# Patient Record
Sex: MALE | Race: WHITE | NOT HISPANIC OR LATINO | Employment: UNEMPLOYED | URBAN - NONMETROPOLITAN AREA
[De-identification: names, ages, dates, MRNs, and addresses within clinical notes are randomized per-mention and may not be internally consistent; named-entity substitution may affect disease eponyms.]

---

## 2019-04-08 ENCOUNTER — TELEPHONE (OUTPATIENT)
Dept: SURGERY | Facility: CLINIC | Age: 44
End: 2019-04-08

## 2019-04-10 ENCOUNTER — HOSPITAL ENCOUNTER (OUTPATIENT)
Dept: PREOP | Facility: HOSPITAL | Age: 44
Discharge: HOME | End: 2019-04-10
Admitting: SURGERY

## 2019-04-10 VITALS
BODY MASS INDEX: 33.86 KG/M2 | SYSTOLIC BLOOD PRESSURE: 130 MMHG | RESPIRATION RATE: 20 BRPM | HEIGHT: 72 IN | TEMPERATURE: 97.5 F | WEIGHT: 250 LBS | DIASTOLIC BLOOD PRESSURE: 75 MMHG | HEART RATE: 78 BPM | OXYGEN SATURATION: 97 %

## 2019-04-10 DIAGNOSIS — D50.0 IRON DEFICIENCY ANEMIA DUE TO CHRONIC BLOOD LOSS: ICD-10-CM

## 2019-04-10 DIAGNOSIS — D50.0 IRON DEFICIENCY ANEMIA DUE TO CHRONIC BLOOD LOSS: Primary | ICD-10-CM

## 2019-04-10 PROCEDURE — 91110 GI TRC IMG INTRAL ESOPH-ILE: CPT

## 2019-04-10 RX ORDER — VARENICLINE TARTRATE 1 MG/1
1 TABLET, FILM COATED ORAL 2 TIMES DAILY
Refills: 6 | COMMUNITY
Start: 2019-03-23 | End: 2021-10-13

## 2019-04-10 RX ORDER — LOSARTAN POTASSIUM 100 MG/1
100 TABLET ORAL DAILY
Refills: 6 | COMMUNITY
Start: 2019-03-18 | End: 2021-10-13

## 2019-04-10 RX ORDER — GABAPENTIN 300 MG/1
300 CAPSULE ORAL 3 TIMES DAILY
Refills: 3 | COMMUNITY
Start: 2019-01-25 | End: 2021-10-13

## 2019-04-10 NOTE — NURSING NOTE
Pt was able to swallow pill for procedure without difficulty.  Pill cam instructions given to pt and informed to return pill cam equipment to Dr. Jolly office in [place] in the morning.No s/s of distress noted upon discharge. Pt ambulated to car himself.

## 2019-06-06 ENCOUNTER — TELEPHONE (OUTPATIENT)
Dept: SURGERY | Facility: CLINIC | Age: 44
End: 2019-06-06

## 2019-06-06 NOTE — TELEPHONE ENCOUNTER
I have been trying to reach this patient regarding his pill cam report. We spoke with  office and they have asked us to try and reach the patient to see if he could redo his pill cam due to poor prep. They can't reach him by phone.  The phone number is disconnected. I will send this patient a letter requesting he call our office.

## 2021-10-13 ENCOUNTER — OFFICE VISIT (OUTPATIENT)
Dept: CARDIOLOGY | Facility: CLINIC | Age: 46
End: 2021-10-13

## 2021-10-13 VITALS
BODY MASS INDEX: 30.92 KG/M2 | SYSTOLIC BLOOD PRESSURE: 130 MMHG | HEIGHT: 70 IN | TEMPERATURE: 96.9 F | WEIGHT: 216 LBS | DIASTOLIC BLOOD PRESSURE: 80 MMHG | HEART RATE: 76 BPM

## 2021-10-13 DIAGNOSIS — R07.2 PRECORDIAL PAIN: Primary | ICD-10-CM

## 2021-10-13 DIAGNOSIS — E88.81 METABOLIC SYNDROME: ICD-10-CM

## 2021-10-13 DIAGNOSIS — I71.03 DISSECTION OF THORACOABDOMINAL AORTA (HCC): ICD-10-CM

## 2021-10-13 DIAGNOSIS — E55.9 VITAMIN D DEFICIENCY: ICD-10-CM

## 2021-10-13 DIAGNOSIS — F11.21 OPIOID USE DISORDER, SEVERE, IN SUSTAINED REMISSION (HCC): ICD-10-CM

## 2021-10-13 DIAGNOSIS — F17.200 SMOKING: ICD-10-CM

## 2021-10-13 DIAGNOSIS — I10 PRIMARY HYPERTENSION: ICD-10-CM

## 2021-10-13 DIAGNOSIS — R53.82 CHRONIC FATIGUE: ICD-10-CM

## 2021-10-13 DIAGNOSIS — I48.0 PAF (PAROXYSMAL ATRIAL FIBRILLATION) (HCC): ICD-10-CM

## 2021-10-13 PROBLEM — IMO0001 SMOKING: Status: ACTIVE | Noted: 2021-10-13

## 2021-10-13 PROBLEM — E88.810 METABOLIC SYNDROME: Status: ACTIVE | Noted: 2021-10-13

## 2021-10-13 PROCEDURE — 99204 OFFICE O/P NEW MOD 45 MIN: CPT | Performed by: INTERNAL MEDICINE

## 2021-10-13 PROCEDURE — 93000 ELECTROCARDIOGRAM COMPLETE: CPT | Performed by: INTERNAL MEDICINE

## 2021-10-13 RX ORDER — NIFEDIPINE 60 MG/1
60 TABLET, EXTENDED RELEASE ORAL 2 TIMES DAILY
COMMUNITY
End: 2021-11-05 | Stop reason: SDUPTHER

## 2021-10-13 RX ORDER — METOPROLOL TARTRATE 75 MG/1
TABLET, FILM COATED ORAL
COMMUNITY
End: 2021-11-05 | Stop reason: SDUPTHER

## 2021-10-13 RX ORDER — ISOSORBIDE DINITRATE 20 MG/1
20 TABLET ORAL 3 TIMES DAILY
COMMUNITY
End: 2021-11-05 | Stop reason: SDUPTHER

## 2021-10-13 RX ORDER — PANTOPRAZOLE SODIUM 40 MG/1
40 TABLET, DELAYED RELEASE ORAL DAILY
Qty: 30 TABLET | Refills: 6 | Status: SHIPPED | OUTPATIENT
Start: 2021-10-13 | End: 2022-07-20 | Stop reason: SDUPTHER

## 2021-10-13 RX ORDER — LISINOPRIL 10 MG/1
10 TABLET ORAL DAILY
COMMUNITY
End: 2021-11-05 | Stop reason: SDUPTHER

## 2021-10-13 RX ORDER — HYDRALAZINE HYDROCHLORIDE 100 MG/1
100 TABLET, FILM COATED ORAL 3 TIMES DAILY
COMMUNITY
End: 2021-11-05 | Stop reason: SDUPTHER

## 2021-10-13 RX ORDER — NICOTINE 10 MG
1 CARTRIDGE (EA) INHALATION AS NEEDED
Qty: 168 EACH | Refills: 6 | Status: SHIPPED | OUTPATIENT
Start: 2021-10-13 | End: 2022-07-20

## 2021-10-13 NOTE — PROGRESS NOTES
Jorge A Gordillo  reports that he has been smoking cigarettes. He has been smoking about 0.25 packs per day. He has never used smokeless tobacco.. I have educated him on the risk of diseases from using tobacco products such as cancer, COPD and heart disease.     I advised him to quit and he is willing to quit. We have discussed the following method/s for tobacco cessation:  Education Material Counseling Prescription Medicaiton.  Together we have set a quit date for 1 week from today.  He will follow up with me in 6 months or sooner to check on his progress.    I spent 3  minutes counseling the patient.

## 2021-10-13 NOTE — PATIENT INSTRUCTIONS
For more information:    Quit Now [place]  1-800-QUIT-NOW  https://[place].quitlogix.org/en-[place]/  Steps to Quit Smoking  Smoking tobacco can be harmful to your health and can affect almost every organ in your body. Smoking puts you, and those around you, at risk for developing many serious chronic diseases. Quitting smoking is difficult, but it is one of the best things that you can do for your health. It is never too late to quit.  What are the benefits of quitting smoking?  When you quit smoking, you lower your risk of developing serious diseases and conditions, such as:  · Lung cancer or lung disease, such as COPD.  · Heart disease.  · Stroke.  · Heart attack.  · Infertility.  · Osteoporosis and bone fractures.  Additionally, symptoms such as coughing, wheezing, and shortness of breath may get better when you quit. You may also find that you get sick less often because your body is stronger at fighting off colds and infections. If you are pregnant, quitting smoking can help to reduce your chances of having a baby of low birth weight.  How do I get ready to quit?  When you decide to quit smoking, create a plan to make sure that you are successful. Before you quit:  · Pick a date to quit. Set a date within the next two weeks to give you time to prepare.  · Write down the reasons why you are quitting. Keep this list in places where you will see it often, such as on your bathroom mirror or in your car or wallet.  · Identify the people, places, things, and activities that make you want to smoke (triggers) and avoid them. Make sure to take these actions:  ¨ Throw away all cigarettes at home, at work, and in your car.  ¨ Throw away smoking accessories, such as ashtrays and lighters.  ¨ Clean your car and make sure to empty the ashtray.  ¨ Clean your home, including curtains and carpets.  · Tell your family, friends, and coworkers that you are quitting. Support from your loved ones can make quitting easier.  · Talk with  your health care provider about your options for quitting smoking.  · Find out what treatment options are covered by your health insurance.  What strategies can I use to quit smoking?  Talk with your healthcare provider about different strategies to quit smoking. Some strategies include:  · Quitting smoking altogether instead of gradually lessening how much you smoke over a period of time. Research shows that quitting “cold turkey” is more successful than gradually quitting.  · Attending in-person counseling to help you build problem-solving skills. You are more likely to have success in quitting if you attend several counseling sessions. Even short sessions of 10 minutes can be effective.  · Finding resources and support systems that can help you to quit smoking and remain smoke-free after you quit. These resources are most helpful when you use them often. They can include:  ¨ Online chats with a counselor.  ¨ Telephone quitlines.  ¨ Printed self-help materials.  ¨ Support groups or group counseling.  ¨ Text messaging programs.  ¨ Mobile phone applications.  · Taking medicines to help you quit smoking. (If you are pregnant or breastfeeding, talk with your health care provider first.) Some medicines contain nicotine and some do not. Both types of medicines help with cravings, but the medicines that include nicotine help to relieve withdrawal symptoms. Your health care provider may recommend:  ¨ Nicotine patches, gum, or lozenges.  ¨ Nicotine inhalers or sprays.  ¨ Non-nicotine medicine that is taken by mouth.  Talk with your health care provider about combining strategies, such as taking medicines while you are also receiving in-person counseling. Using these two strategies together makes you more likely to succeed in quitting than if you used either strategy on its own.  If you are pregnant or breastfeeding, talk with your health care provider about finding counseling or other support strategies to quit smoking. Do  not take medicine to help you quit smoking unless told to do so by your health care provider.  What things can I do to make it easier to quit?  Quitting smoking might feel overwhelming at first, but there is a lot that you can do to make it easier. Take these important actions:  · Reach out to your family and friends and ask that they support and encourage you during this time. Call telephone quitlines, reach out to support groups, or work with a counselor for support.  · Ask people who smoke to avoid smoking around you.  · Avoid places that trigger you to smoke, such as bars, parties, or smoke-break areas at work.  · Spend time around people who do not smoke.  · Lessen stress in your life, because stress can be a smoking trigger for some people. To lessen stress, try:  ¨ Exercising regularly.  ¨ Deep-breathing exercises.  ¨ Yoga.  ¨ Meditating.  ¨ Performing a body scan. This involves closing your eyes, scanning your body from head to toe, and noticing which parts of your body are particularly tense. Purposefully relax the muscles in those areas.  · Download or purchase mobile phone or tablet apps (applications) that can help you stick to your quit plan by providing reminders, tips, and encouragement. There are many free apps, such as QuitGuide from the CDC (Centers for Disease Control and Prevention). You can find other support for quitting smoking (smoking cessation) through smokefree.gov and other websites.  How will I feel when I quit smoking?  Within the first 24 hours of quitting smoking, you may start to feel some withdrawal symptoms. These symptoms are usually most noticeable 2-3 days after quitting, but they usually do not last beyond 2-3 weeks. Changes or symptoms that you might experience include:  · Mood swings.  · Restlessness, anxiety, or irritation.  · Difficulty concentrating.  · Dizziness.  · Strong cravings for sugary foods in addition to nicotine.  · Mild weight  gain.  · Constipation.  · Nausea.  · Coughing or a sore throat.  · Changes in how your medicines work in your body.  · A depressed mood.  · Difficulty sleeping (insomnia).  After the first 2-3 weeks of quitting, you may start to notice more positive results, such as:  · Improved sense of smell and taste.  · Decreased coughing and sore throat.  · Slower heart rate.  · Lower blood pressure.  · Clearer skin.  · The ability to breathe more easily.  · Fewer sick days.  Quitting smoking is very challenging for most people. Do not get discouraged if you are not successful the first time. Some people need to make many attempts to quit before they achieve long-term success. Do your best to stick to your quit plan, and talk with your health care provider if you have any questions or concerns.  This information is not intended to replace advice given to you by your health care provider. Make sure you discuss any questions you have with your health care provider.  Document Released: 12/12/2002 Document Revised: 08/15/2017 Document Reviewed: 05/03/2016  [employer] Patient Education © 2017 [employer]  Mediterranean Diet  A Mediterranean diet refers to food and lifestyle choices that are based on the traditions of countries located on the [place]. This way of eating has been shown to help prevent certain conditions and improve outcomes for people who have chronic diseases, like kidney disease and heart disease.  What are tips for following this plan?  Lifestyle  · Cook and eat meals together with your family, when possible.  · Drink enough fluid to keep your urine clear or pale yellow.  · Be physically active every day. This includes:  ? Aerobic exercise like running or swimming.  ? Leisure activities like gardening, walking, or housework.  · Get 7-8 hours of sleep each night.  · If recommended by your health care provider, drink red wine in moderation. This means 1 glass a day for nonpregnant women and 2  glasses a day for men. A glass of wine equals 5 oz (150 mL).  Reading food labels    · Check the serving size of packaged foods. For foods such as rice and pasta, the serving size refers to the amount of cooked product, not dry.  · Check the total fat in packaged foods. Avoid foods that have saturated fat or trans fats.  · Check the ingredients list for added sugars, such as corn syrup.    Shopping  · At the grocery store, buy most of your food from the areas near the walls of the store. This includes:  ? Fresh fruits and vegetables (produce).  ? Grains, beans, nuts, and seeds. Some of these may be available in unpackaged forms or large amounts (in bulk).  ? Fresh seafood.  ? Poultry and eggs.  ? Low-fat dairy products.  · Buy whole ingredients instead of prepackaged foods.  · Buy fresh fruits and vegetables in-season from local [employer] markets.  · Buy frozen fruits and vegetables in resealable bags.  · If you do not have access to quality fresh seafood, buy precooked frozen shrimp or canned fish, such as tuna, salmon, or sardines.  · Buy small amounts of raw or cooked vegetables, salads, or olives from the deli or salad bar at your store.  · Stock your pantry so you always have certain foods on hand, such as olive oil, canned tuna, canned tomatoes, rice, pasta, and beans.  Cooking  · Cook foods with extra-virgin olive oil instead of using butter or other vegetable oils.  · Have meat as a side dish, and have vegetables or grains as your main dish. This means having meat in small portions or adding small amounts of meat to foods like pasta or stew.  · Use beans or vegetables instead of meat in common dishes like chili or lasagna.  · [place] with different cooking methods. Try roasting or broiling vegetables instead of steaming or sautéeing them.  · Add frozen vegetables to soups, stews, pasta, or rice.  · Add nuts or seeds for added healthy fat at each meal. You can add these to yogurt, salads, or vegetable  dishes.  · Marinate fish or vegetables using olive oil, lemon juice, garlic, and fresh herbs.  Meal planning    · Plan to eat 1 vegetarian meal one day each week. Try to work up to 2 vegetarian meals, if possible.  · Eat seafood 2 or more times a week.  · Have healthy snacks readily available, such as:  ? Vegetable sticks with hummus.  ? Greek yogurt.  ? Fruit and nut trail mix.  · Eat balanced meals throughout the week. This includes:  ? Fruit: 2-3 servings a day  ? Vegetables: 4-5 servings a day  ? Low-fat dairy: 2 servings a day  ? Fish, poultry, or lean meat: 1 serving a day  ? Beans and legumes: 2 or more servings a week  ? Nuts and seeds: 1-2 servings a day  ? Whole grains: 6-8 servings a day  ? Extra-virgin olive oil: 3-4 servings a day  · Limit red meat and sweets to only a few servings a month    What are my food choices?  · Mediterranean diet  ? Recommended  § Grains: Whole-grain pasta. Brown rice. Bulgar wheat. Polenta. Couscous. Whole-wheat bread. Oatmeal. Quinoa.  § Vegetables: Artichokes. Beets. Broccoli. Cabbage. Carrots. Eggplant. Green beans. Chard. Kale. Spinach. Onions. Leeks. Peas. Squash. Tomatoes. Peppers. Radishes.  § Fruits: Apples. Apricots. Avocado. Berries. Bananas. Cherries. Dates. Figs. Grapes. Yonathan. Melon. Oranges. Peaches. Plums. Pomegranate.  § Meats and other protein foods: Beans. Almonds. Sunflower seeds. Pine nuts. Peanuts. Cod. [place]. Scallops. Shrimp. Tuna. Tilapia. Clams. Oysters. Eggs.  § Dairy: Low-fat milk. Cheese. Greek yogurt.  § Beverages: Water. Red wine. Herbal tea.  § Fats and oils: Extra virgin olive oil. Avocado oil. Grape seed oil.  § Sweets and desserts: Greek yogurt with honey. Baked apples. Poached pears. Trail mix.  § Seasoning and other foods: Basil. Cilantro. Coriander. Cumin. Mint. Parsley. Luigi. Rosemary. Tarragon. Garlic. Oregano. Thyme. Pepper. Balsalmic vinegar. Tahini. Hummus. Tomato sauce. Olives. Mushrooms.  ? Limit these  § Grains: Prepackaged pasta  or rice dishes. Prepackaged cereal with added sugar.  § Vegetables: Deep fried potatoes (french fries).  § Fruits: Fruit canned in syrup.  § Meats and other protein foods: Beef. Pork. Lamb. Poultry with skin. Hot dogs. [place].  § Dairy: Ice cream. Sour cream. Whole milk.  § Beverages: Juice. Sugar-sweetened soft drinks. Beer. Liquor and spirits.  § Fats and oils: Butter. Canola oil. Vegetable oil. Beef fat (tallow). Lard.  § Sweets and desserts: Cookies. Cakes. Pies. Candy.  § Seasoning and other foods: Mayonnaise. Premade sauces and marinades.  The items listed may not be a complete list. Talk with your dietitian about what dietary choices are right for you.  Summary  · The Mediterranean diet includes both food and lifestyle choices.  · Eat a variety of fresh fruits and vegetables, beans, nuts, seeds, and whole grains.  · Limit the amount of red meat and sweets that you eat.  · Talk with your health care provider about whether it is safe for you to drink red wine in moderation. This means 1 glass a day for nonpregnant women and 2 glasses a day for men. A glass of wine equals 5 oz (150 mL).  This information is not intended to replace advice given to you by your health care provider. Make sure you discuss any questions you have with your health care provider.  Document Revised: 08/17/2017 Document Reviewed: 08/10/2017  [employer] Patient Education © 2020 [employer]

## 2021-10-13 NOTE — PROGRESS NOTES
"Chief Complaint   Patient presents with   • Establish Care     Patient referred per Dr Hatfield for chest pain, needing cardiac clearance for EGD and poss Colonoscopy.    • Cardiac history     MI in 2018, Dr Devlin followed with Loop recorder shortly after that. Recent aortic dissection, went to ER here,  transfer to . Records in Care Everywhere.  Left [facility] AMA, had lost a tremdous amount of weight, felt like no one was listening to him.    • Labs     recent labs in chart   • Chest Pain     has been having a lot of chest pain, randomly occurs, has a \" constant quivering feeling in chest\"  has been back to ER and left AMA.    • Aspirin     does not take an aspirin or any other blood thinner.         CARDIAC COMPLAINTS  chest pressure/discomfort and fatigue      Subjective   Jorge A Gordillo is a 46 y.o. male came in today for his initial cardiac evaluation.  He has history of hypertension, history of syncopal episodes in the past who has undergone investigation including cardiac catheterization.  He was seen about a month ago with severe chest pain and near syncopal episodes.  Work-up showed acute dissection involving the thoracic aorta.  He was referred to [place] where he underwent endovascular stenting.  During that time he also had rectal bleed.  He was noted to be in atrial fibrillation and apparently got converted to sinus spontaneously.  He did not stay back to undergo all the investigation and signed out AGAINST MEDICAL ADVICE.  He was seen in the ER recently with worsening of chest pain again.  His CT scan showed the dissection appears stable.  Before they were able to do any more investigation he signed out again AMA.  He is now being followed by Dr. Hatfield.  He needs to undergo EGD and colonoscopy and is now referred for cardiac clearance.  He continues to have some chest pain on and off on exertion.  He does have increasing fatigue and tiredness.  He has lost fairly large amount of weight in the last few " months.  His lab work showed abnormal liver function test with the SGOT of 170 alkaline phosphatase of 239 and SGPT of 346.  His bilirubin was normal his blood sugar was normal.  He smokes about a pack a day in the past but now was cut down to quarter pack.  He also used methamphetamine in the past which he quit in uses marijuana he is not drinking alcohol at this time.  He has family history of premature coronary disease.    Past Surgical History:   Procedure Laterality Date   • CARDIAC CATHETERIZATION  04/26/2019    - Normal Coronaries. EF 55%   • CARDIOVASCULAR STRESS TEST  03/26/2018    @ [facility]. Dr. Stringer- Adenosine- EF 42%. Inferior Infarct   • ECHO - CONVERTED  09/17/2021    @ . EF 57%. Trace MR   • ECHO - CONVERTED  03/27/2018    @ [facility]. - EF 60%. Trace MR   • KNEE SURGERY     • OTHER SURGICAL HISTORY  10/02/2021    CTA- Patent Thoracic stent.AAA distal to stent   • OTHER SURGICAL HISTORY  09/17/2021    CTA- Dissection of thoracis aorta distal to (L) Subclavian artery   • OTHER SURGICAL HISTORY  06/04/2018    Loop recorder placement   • OTHER SURGICAL HISTORY  09/22/2021    @ . Thoracic endovascular stent       Current Outpatient Medications   Medication Sig Dispense Refill   • hydrALAZINE (APRESOLINE) 100 MG tablet Take 100 mg by mouth 3 (Three) Times a Day.     • isosorbide dinitrate (ISORDIL) 20 MG tablet Take 20 mg by mouth 3 (Three) Times a Day.     • lisinopril (PRINIVIL,ZESTRIL) 10 MG tablet Take 10 mg by mouth Daily.     • Metoprolol Tartrate 75 MG tablet Take  by mouth. 2 tabs twice a day     • NIFEdipine XL (PROCARDIA XL) 60 MG 24 hr tablet Take 60 mg by mouth 2 (two) times a day.     • nicotine (Nicotrol) 10 MG inhaler Inhale 1 puff As Needed for Smoking Cessation. 168 each 6   • pantoprazole (Protonix) 40 MG EC tablet Take 1 tablet by mouth Daily. 30 tablet 6     No current facility-administered medications for this visit.           ALLERGIES:  Patient has no known  "allergies.    Past Medical History:   Diagnosis Date   • Arthritis    • Atrial fibrillation (HCC)    • Dissecting AAA (abdominal aortic aneurysm) (HCC)    • Elevated cholesterol    • GERD (gastroesophageal reflux disease)    • Hypertension    • Myocardial infarct (HCC)        Social History     Tobacco Use   Smoking Status Current Every Day Smoker   • Packs/day: 0.25   • Types: Cigarettes   Smokeless Tobacco Never Used   Tobacco Comment    trying to quit, would like to try Chantix          Family History   Problem Relation Age of Onset   • Heart disease Mother    • Other Father          in accident at 42   • Heart disease Sister    • Other Brother         PPM   • Heart disease Brother    • Other Other         grandparents medical history unknown   • No Known Problems Sister    • No Known Problems Brother        Review of Systems   Constitutional: Positive for malaise/fatigue and weight loss. Negative for decreased appetite.   HENT: Negative for congestion and sore throat.    Eyes: Negative for blurred vision.   Cardiovascular: Positive for chest pain.   Respiratory: Negative for shortness of breath and snoring.    Endocrine: Negative for cold intolerance and heat intolerance.   Hematologic/Lymphatic: Negative for adenopathy. Does not bruise/bleed easily.   Skin: Negative for itching, nail changes and skin cancer.   Musculoskeletal: Negative for arthritis and myalgias.   Gastrointestinal: Negative for abdominal pain, dysphagia and heartburn.   Genitourinary: Negative for bladder incontinence and frequency.   Neurological: Negative for dizziness, light-headedness, seizures and vertigo.   Psychiatric/Behavioral: Negative for altered mental status.   Allergic/Immunologic: Negative for environmental allergies and hives.       Diabetes- No  Thyroid- normal    Objective     /80 (BP Location: Left arm)   Pulse 76   Temp 96.9 °F (36.1 °C)   Ht 177.8 cm (70\")   Wt 98 kg (216 lb)   BMI 30.99 kg/m²     Vitals and " nursing note reviewed.   Constitutional:       Appearance: Healthy appearance. Not in distress.   Eyes:      Pupils: Pupils are equal, round, and reactive to light.   HENT:      Head: Normocephalic.   Pulmonary:      Effort: Pulmonary effort is normal.      Breath sounds: Normal breath sounds.   Cardiovascular:      PMI at left midclavicular line. Normal rate. Regular rhythm.      Murmurs: There is a systolic murmur.   Abdominal:      General: Bowel sounds are normal.      Palpations: Abdomen is soft.   Musculoskeletal: Normal range of motion.      Cervical back: Normal range of motion and neck supple. Skin:     General: Skin is warm and dry.   Neurological:      Mental Status: Alert, oriented to person, place, and time and oriented to person, place and time.           ECG 12 Lead    Date/Time: 10/13/2021 1:08 PM  Performed by: Kyle Baker MD  Authorized by: Kyle Baker MD   Comparison: compared with previous ECG from 9/22/2021  Comparison to previous ECG: Not in A.Fib  Rhythm: sinus rhythm  Rate: normal  QRS axis: normal  Other findings: non-specific ST-T wave changes    Clinical impression: non-specific ECG              Assessment/Plan   Patient's Body mass index is 30.99 kg/m². indicating that he is obese (BMI >30). Obesity-related health conditions include the following: hypertension. Obesity is newly identified. BMI is is above average; BMI management plan is completed. We discussed low calorie, low carb based diet program, portion control and increasing exercise..     Diagnoses and all orders for this visit:    1. Precordial pain (Primary)  -     Stress Test With Myocardial Perfusion One Day; Future  -     High Sensitivity CRP; Future  -     Lipid Panel; Future  -     pantoprazole (Protonix) 40 MG EC tablet; Take 1 tablet by mouth Daily.  Dispense: 30 tablet; Refill: 6    2. Primary hypertension  -     Comprehensive Metabolic Panel; Future    3. PAF (paroxysmal atrial fibrillation) (HCC)  -      Adult Transthoracic Echo Complete W/ Cont if Necessary Per Protocol; Future    4. Dissection of thoracoabdominal aorta (HCC)    5. Opioid use disorder, severe, in sustained remission (HCC)    6. Smoking  -     CBC & Differential; Future  -     nicotine (Nicotrol) 10 MG inhaler; Inhale 1 puff As Needed for Smoking Cessation.  Dispense: 168 each; Refill: 6    7. Chronic fatigue  -     CBC & Differential; Future  -     Overnight Sleep Oximetry Study; Future  -     Vitamin B12; Future  -     Folate; Future    8. Metabolic syndrome    9. Vitamin D deficiency  -     Vitamin D 25 Hydroxy; Future    At baseline his heart rate is stable.  His blood pressure is normal.  His EKG which was done today showed that he is in sinus rhythm with a left atrial enlargement.  His clinical examination reveals a BMI of 31.  His cardiovascular examination reveals systolic murmur at the mitral area and has slightly diminished peripheral pulse.    Regarding the chest pain, he does have a history of normal coronaries few years ago but unfortunately he does have multiple risk factors.  I schedule him to undergo a stress test in the form of Lexiscan to rule out ischemia.  I also advised him to undergo lab work to check his CRP level and lipid profile.  I start him on Protonix 40 mg once a day till the test is done.    Regarding his hypertension, it is controlled with hydralazine, ACE inhibitor's and Procardia XL.  Continue the same for now along with the metoprolol.  Recheck his labs especially the renal function and electrolytes.    Regarding his history of PAF he is maintaining sinus rhythm.  At this time continue metoprolol.  I did not start him on anticoagulant because of his recent GI bleed.    Regarding his dissection, his last CT scan showed the stent appears to be patent.  He still has dissection but is in the distal end of the stent in the abdominal aorta which can be matted conservatively    Regarding his use of drugs, he is in  remission.  I talked to him in detail about the increased risk of developing complications including infection.    Regarding his smoking, I talked to him about increased risk of malignancy as well as vascular event.  He is willing to try to quit smoking.  I talked to him about Nicotrol inhalers and prescription was given.    Regarding his chronic fatigue, advised him to check his CBC along with B12 and folic acid level.  Also talked to him about his metabolic syndrome and gave him Mediterranean diet.  I also advised him to check his nocturnal pulse PO2 measurement to rule out sleep apnea.    Regarding his diffuse body ache, need to rule out vitamin deficiency.  He is advised to have his vitamin D level checked.    Regarding the loop recorder, I am going to have it interrogated again.  If there is no battery or if there is no new changes, then will arrange for removal.    Based on the results, further recommendations will be made.               Electronically signed by Kyle Baker MD October 13, 2021 12:57 EDT

## 2021-10-22 ENCOUNTER — HOSPITAL ENCOUNTER (OUTPATIENT)
Dept: CARDIOLOGY | Facility: HOSPITAL | Age: 46
Discharge: HOME | End: 2021-10-22

## 2021-10-22 VITALS — HEIGHT: 70 IN | WEIGHT: 216.05 LBS | BODY MASS INDEX: 30.93 KG/M2

## 2021-10-22 DIAGNOSIS — I48.0 PAF (PAROXYSMAL ATRIAL FIBRILLATION) (HCC): ICD-10-CM

## 2021-10-22 DIAGNOSIS — R07.2 PRECORDIAL PAIN: ICD-10-CM

## 2021-10-22 PROCEDURE — 78452 HT MUSCLE IMAGE SPECT MULT: CPT

## 2021-10-22 PROCEDURE — 0 TECHNETIUM SESTAMIBI: Performed by: INTERNAL MEDICINE

## 2021-10-22 PROCEDURE — 93306 TTE W/DOPPLER COMPLETE: CPT

## 2021-10-22 PROCEDURE — 93306 TTE W/DOPPLER COMPLETE: CPT | Performed by: INTERNAL MEDICINE

## 2021-10-22 PROCEDURE — 93018 CV STRESS TEST I&R ONLY: CPT | Performed by: INTERNAL MEDICINE

## 2021-10-22 PROCEDURE — A9500 TC99M SESTAMIBI: HCPCS | Performed by: INTERNAL MEDICINE

## 2021-10-22 PROCEDURE — 93017 CV STRESS TEST TRACING ONLY: CPT

## 2021-10-22 PROCEDURE — 78452 HT MUSCLE IMAGE SPECT MULT: CPT | Performed by: INTERNAL MEDICINE

## 2021-10-22 PROCEDURE — 25010000002 REGADENOSON 0.4 MG/5ML SOLUTION: Performed by: INTERNAL MEDICINE

## 2021-10-22 RX ADMIN — REGADENOSON 0.4 MG: 0.08 INJECTION, SOLUTION INTRAVENOUS at 10:37

## 2021-10-22 RX ADMIN — TECHNETIUM TC 99M SESTAMIBI 1 DOSE: 1 INJECTION INTRAVENOUS at 08:28

## 2021-10-22 RX ADMIN — TECHNETIUM TC 99M SESTAMIBI 1 DOSE: 1 INJECTION INTRAVENOUS at 10:37

## 2021-10-23 LAB
AORTIC DIMENSIONLESS INDEX: 1 (DI)
BH CV ECHO MEAS - ACS: 2.5 CM
BH CV ECHO MEAS - AO MAX PG (FULL): -0.16 MMHG
BH CV ECHO MEAS - AO MAX PG: 4 MMHG
BH CV ECHO MEAS - AO MEAN PG (FULL): 0 MMHG
BH CV ECHO MEAS - AO MEAN PG: 2 MMHG
BH CV ECHO MEAS - AO ROOT AREA (BSA CORRECTED): 1.7
BH CV ECHO MEAS - AO ROOT AREA: 10.8 CM^2
BH CV ECHO MEAS - AO ROOT DIAM: 3.7 CM
BH CV ECHO MEAS - AO V2 MAX: 100 CM/SEC
BH CV ECHO MEAS - AO V2 MEAN: 67.2 CM/SEC
BH CV ECHO MEAS - AO V2 VTI: 21.4 CM
BH CV ECHO MEAS - BSA(HAYCOCK): 2.2 M^2
BH CV ECHO MEAS - BSA: 2.2 M^2
BH CV ECHO MEAS - BZI_BMI: 31 KILOGRAMS/M^2
BH CV ECHO MEAS - BZI_METRIC_HEIGHT: 177.8 CM
BH CV ECHO MEAS - BZI_METRIC_WEIGHT: 98 KG
BH CV ECHO MEAS - EDV(CUBED): 139 ML
BH CV ECHO MEAS - EDV(TEICH): 128.4 ML
BH CV ECHO MEAS - EF(CUBED): 67.3 %
BH CV ECHO MEAS - EF(MOD-BP): 58 %
BH CV ECHO MEAS - EF(TEICH): 58.4 %
BH CV ECHO MEAS - EF_3D-VOL: 51 %
BH CV ECHO MEAS - ESV(CUBED): 45.5 ML
BH CV ECHO MEAS - ESV(TEICH): 53.3 ML
BH CV ECHO MEAS - FS: 31.1 %
BH CV ECHO MEAS - IVS/LVPW: 0.98
BH CV ECHO MEAS - IVSD: 1.2 CM
BH CV ECHO MEAS - LA DIMENSION: 3.9 CM
BH CV ECHO MEAS - LA/AO: 1
BH CV ECHO MEAS - LAT PEAK E' VEL: 8.9 CM/SEC
BH CV ECHO MEAS - LV IVRT: 0.12 SEC
BH CV ECHO MEAS - LV MASS(C)D: 260.4 GRAMS
BH CV ECHO MEAS - LV MASS(C)DI: 120.7 GRAMS/M^2
BH CV ECHO MEAS - LV MAX PG: 4.2 MMHG
BH CV ECHO MEAS - LV MEAN PG: 2 MMHG
BH CV ECHO MEAS - LV V1 MAX: 102 CM/SEC
BH CV ECHO MEAS - LV V1 MEAN: 56.9 CM/SEC
BH CV ECHO MEAS - LV V1 VTI: 21.1 CM
BH CV ECHO MEAS - LVIDD: 5.2 CM
BH CV ECHO MEAS - LVIDS: 3.6 CM
BH CV ECHO MEAS - LVPWD: 1.3 CM
BH CV ECHO MEAS - MED PEAK E' VEL: 6.5 CM/SEC
BH CV ECHO MEAS - MR MAX PG: 8 MMHG
BH CV ECHO MEAS - MR MAX VEL: 141 CM/SEC
BH CV ECHO MEAS - MV A MAX VEL: 71.4 CM/SEC
BH CV ECHO MEAS - MV DEC SLOPE: 205 CM/SEC^2
BH CV ECHO MEAS - MV DEC TIME: 0.26 SEC
BH CV ECHO MEAS - MV E MAX VEL: 71.4 CM/SEC
BH CV ECHO MEAS - MV E/A: 1
BH CV ECHO MEAS - MV MAX PG: 2.7 MMHG
BH CV ECHO MEAS - MV MEAN PG: 1 MMHG
BH CV ECHO MEAS - MV P1/2T MAX VEL: 78.2 CM/SEC
BH CV ECHO MEAS - MV P1/2T: 111.7 MSEC
BH CV ECHO MEAS - MV V2 MAX: 81.8 CM/SEC
BH CV ECHO MEAS - MV V2 MEAN: 56.2 CM/SEC
BH CV ECHO MEAS - MV V2 VTI: 37.9 CM
BH CV ECHO MEAS - MVA P1/2T LCG: 2.8 CM^2
BH CV ECHO MEAS - MVA(P1/2T): 2 CM^2
BH CV ECHO MEAS - PA MAX PG (FULL): 1.4 MMHG
BH CV ECHO MEAS - PA MAX PG: 3.5 MMHG
BH CV ECHO MEAS - PA MEAN PG (FULL): 1 MMHG
BH CV ECHO MEAS - PA MEAN PG: 2 MMHG
BH CV ECHO MEAS - PA V2 MAX: 93.3 CM/SEC
BH CV ECHO MEAS - PA V2 MEAN: 61 CM/SEC
BH CV ECHO MEAS - PA V2 VTI: 19.8 CM
BH CV ECHO MEAS - RAP SYSTOLE: 10 MMHG
BH CV ECHO MEAS - RV MAX PG: 2.1 MMHG
BH CV ECHO MEAS - RV MEAN PG: 1 MMHG
BH CV ECHO MEAS - RV V1 MAX: 72.5 CM/SEC
BH CV ECHO MEAS - RV V1 MEAN: 43.3 CM/SEC
BH CV ECHO MEAS - RV V1 VTI: 16.2 CM
BH CV ECHO MEAS - RVDD: 3.8 CM
BH CV ECHO MEAS - RVSP: 14 MMHG
BH CV ECHO MEAS - SI(AO): 106.7 ML/M^2
BH CV ECHO MEAS - SI(CUBED): 43.3 ML/M^2
BH CV ECHO MEAS - SI(TEICH): 34.8 ML/M^2
BH CV ECHO MEAS - SV(AO): 230.1 ML
BH CV ECHO MEAS - SV(CUBED): 93.5 ML
BH CV ECHO MEAS - SV(TEICH): 75 ML
BH CV ECHO MEAS - TR MAX VEL: 93.7 CM/SEC
BH CV ECHO MEASUREMENTS AVERAGE E/E' RATIO: 9.27
BH CV REST NUCLEAR ISOTOPE DOSE: 10 MCI
BH CV STRESS COMMENTS STAGE 1: NORMAL
BH CV STRESS DOSE REGADENOSON STAGE 1: 0.4
BH CV STRESS DURATION MIN STAGE 1: 0
BH CV STRESS DURATION SEC STAGE 1: 10
BH CV STRESS NUCLEAR ISOTOPE DOSE: 30 MCI
BH CV STRESS PROTOCOL 1: NORMAL
BH CV STRESS RECOVERY BP: NORMAL MMHG
BH CV STRESS RECOVERY HR: 66 BPM
BH CV STRESS STAGE 1: 1
LV EF NUC BP: 46 %
MAXIMAL PREDICTED HEART RATE: 173 BPM
MAXIMAL PREDICTED HEART RATE: 173 BPM
PERCENT MAX PREDICTED HR: 45.09 %
SINUS: 3.4 CM
STJ: 2.5 CM
STRESS BASELINE BP: NORMAL MMHG
STRESS BASELINE HR: 57 BPM
STRESS PERCENT HR: 53 %
STRESS POST PEAK BP: NORMAL MMHG
STRESS POST PEAK HR: 78 BPM
STRESS TARGET HR: 147 BPM
STRESS TARGET HR: 147 BPM

## 2021-11-03 ENCOUNTER — OFFICE VISIT (OUTPATIENT)
Dept: CARDIOLOGY | Facility: CLINIC | Age: 46
End: 2021-11-03

## 2021-11-03 DIAGNOSIS — I48.0 PAF (PAROXYSMAL ATRIAL FIBRILLATION) (HCC): Primary | ICD-10-CM

## 2021-11-03 DIAGNOSIS — R53.82 CHRONIC FATIGUE: ICD-10-CM

## 2021-11-03 PROCEDURE — 93291 INTERROG DEV EVAL SCRMS IP: CPT | Performed by: INTERNAL MEDICINE

## 2021-11-04 NOTE — TELEPHONE ENCOUNTER
He came by yesterday and spoke with Rizwana requesting refills but did not state pharmacy. He was also here for loop recorder to be checked. Was unable to check due to EOL. Dr Baker is recommending removal of device by Dr Butler. I have been unable to get ahold of him and his VM has not been set up.

## 2021-11-04 NOTE — TELEPHONE ENCOUNTER
Called patient, he would like scripts sent to [facility] pharmacy. He would like to proceed with the referral to Dr Butler for the loop recorder to be removed. Please put in referral.

## 2021-11-05 DIAGNOSIS — Z98.890 HISTORY OF LOOP RECORDER: Primary | ICD-10-CM

## 2021-11-05 RX ORDER — NIFEDIPINE 60 MG/1
60 TABLET, EXTENDED RELEASE ORAL 2 TIMES DAILY
Qty: 180 TABLET | Refills: 3 | Status: SHIPPED | OUTPATIENT
Start: 2021-11-05 | End: 2022-04-13 | Stop reason: DRUGHIGH

## 2021-11-05 RX ORDER — ISOSORBIDE DINITRATE 20 MG/1
20 TABLET ORAL 3 TIMES DAILY
Qty: 270 TABLET | Refills: 3 | Status: SHIPPED | OUTPATIENT
Start: 2021-11-05 | End: 2022-07-20 | Stop reason: SDUPTHER

## 2021-11-05 RX ORDER — LISINOPRIL 10 MG/1
10 TABLET ORAL DAILY
Qty: 90 TABLET | Refills: 3 | Status: SHIPPED | OUTPATIENT
Start: 2021-11-05 | End: 2022-07-20 | Stop reason: SDUPTHER

## 2021-11-05 RX ORDER — HYDRALAZINE HYDROCHLORIDE 100 MG/1
100 TABLET, FILM COATED ORAL 3 TIMES DAILY
Qty: 270 TABLET | Refills: 3 | Status: SHIPPED | OUTPATIENT
Start: 2021-11-05 | End: 2022-04-13 | Stop reason: HOSPADM

## 2021-11-05 RX ORDER — METOPROLOL TARTRATE 75 MG/1
2 TABLET, FILM COATED ORAL 2 TIMES DAILY
Qty: 360 TABLET | Refills: 3 | Status: SHIPPED | OUTPATIENT
Start: 2021-11-05 | End: 2022-04-13 | Stop reason: DRUGHIGH

## 2021-11-09 ENCOUNTER — TELEPHONE (OUTPATIENT)
Dept: CARDIOLOGY | Facility: CLINIC | Age: 46
End: 2021-11-09

## 2021-11-09 NOTE — TELEPHONE ENCOUNTER
I spoke with pt to advise the scripts that were sent in and that he would need to get the Lexapro from his PCP.

## 2021-11-09 NOTE — TELEPHONE ENCOUNTER
PATIENT CAME BY AND STATED THAT DR ZIMMERMAN DID NOT SEND REFILLS IN FOR ALL HIS MEDS.    ESCIATALOPRAM 10 MG  PANTOPRAZOLE 40MG  LISINOPRIL 10 MG   HYDRALOZINE 100 MG  PLEASE SEND TO [facility]

## 2021-11-09 NOTE — TELEPHONE ENCOUNTER
Received phone call from [place] from  office in reference to Jorge A Gordillo .   His referral and all records were faxed to [phone]

## 2022-03-01 ENCOUNTER — TELEPHONE (OUTPATIENT)
Dept: CARDIOLOGY | Facility: CLINIC | Age: 47
End: 2022-03-01

## 2022-03-01 DIAGNOSIS — R07.2 PRECORDIAL PAIN: ICD-10-CM

## 2022-03-01 DIAGNOSIS — R94.39 ABNORMAL MYOCARDIAL PERFUSION STUDY: Primary | ICD-10-CM

## 2022-03-01 NOTE — TELEPHONE ENCOUNTER
Patient is aware that he does need a radial cath. Patient was made aware that you will call to schedule.

## 2022-03-01 NOTE — TELEPHONE ENCOUNTER
"Patient called stating, \"I feel like I have a hula hoop going over my body, from my head to my feet at times\". States that he feels weak when he has these episodes. He states that he has had some chest pain, had to go see a doctor in [place] a couple of weeks ago and went to the ER at  for chest pain. It looks like they recommended at  that he follow up with his cardiologist regarding ordering a new stress test for angina. Patient had stress and echo on 10/22/21, it looks like it was recommended that if patient continued to have chest tightness that he would need cath.   "

## 2022-03-09 ENCOUNTER — OUTSIDE FACILITY SERVICE (OUTPATIENT)
Dept: CARDIOLOGY | Facility: CLINIC | Age: 47
End: 2022-03-09

## 2022-03-09 PROCEDURE — 93458 L HRT ARTERY/VENTRICLE ANGIO: CPT | Performed by: INTERNAL MEDICINE

## 2022-03-09 RX ORDER — METOPROLOL TARTRATE 75 MG/1
2 TABLET, FILM COATED ORAL 2 TIMES DAILY
Qty: 360 TABLET | Refills: 3 | Status: CANCELLED | OUTPATIENT
Start: 2022-03-09

## 2022-04-13 ENCOUNTER — OFFICE VISIT (OUTPATIENT)
Dept: CARDIOLOGY | Facility: CLINIC | Age: 47
End: 2022-04-13

## 2022-04-13 VITALS
DIASTOLIC BLOOD PRESSURE: 80 MMHG | BODY MASS INDEX: 31.52 KG/M2 | SYSTOLIC BLOOD PRESSURE: 150 MMHG | HEIGHT: 70 IN | WEIGHT: 220.2 LBS | HEART RATE: 88 BPM

## 2022-04-13 DIAGNOSIS — I10 PRIMARY HYPERTENSION: ICD-10-CM

## 2022-04-13 DIAGNOSIS — I71.03 DISSECTION OF THORACOABDOMINAL AORTA: ICD-10-CM

## 2022-04-13 DIAGNOSIS — R41.3 MEMORY LOSS: ICD-10-CM

## 2022-04-13 DIAGNOSIS — R00.2 PALPITATIONS: ICD-10-CM

## 2022-04-13 DIAGNOSIS — K92.1 MELENA: ICD-10-CM

## 2022-04-13 DIAGNOSIS — R55 NEAR SYNCOPE: ICD-10-CM

## 2022-04-13 DIAGNOSIS — I48.0 PAF (PAROXYSMAL ATRIAL FIBRILLATION): Primary | ICD-10-CM

## 2022-04-13 PROCEDURE — 93000 ELECTROCARDIOGRAM COMPLETE: CPT | Performed by: NURSE PRACTITIONER

## 2022-04-13 PROCEDURE — 99214 OFFICE O/P EST MOD 30 MIN: CPT | Performed by: NURSE PRACTITIONER

## 2022-04-13 RX ORDER — NIFEDIPINE 30 MG/1
30 TABLET, FILM COATED, EXTENDED RELEASE ORAL DAILY
COMMUNITY
End: 2022-07-20 | Stop reason: SDUPTHER

## 2022-04-13 RX ORDER — METOPROLOL SUCCINATE 50 MG/1
50 TABLET, EXTENDED RELEASE ORAL DAILY
COMMUNITY
End: 2022-07-20 | Stop reason: SDUPTHER

## 2022-04-13 NOTE — PROGRESS NOTES
"Chief Complaint   Patient presents with   • Follow-up     Cardiac managment   • Atrial Fibrillation     Reports he has more \"fluttering \" and \"flipping \" . Has gotten worse in the last couple weeks. He said he feels a \" warm surge\" go all down his body.    • Chest Pain     Has chest discomfort  that also radiates to his back   • LABS     Results January 2022 on chart . Had CT  abdomen, CTA chest from January 2022 on chart   • Dizziness     Reports dizziness and blurry vision with fast movement        Subjective       Jorge A Gordillo is a 47 y.o. male seen October 2021 to establish cardiac care.  He has hypertension and history of syncopal episodes.  He had undergone investigation including cardiac catheterization.  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  He was also noted to be in atrial fibrillation and apparently converted to sinus spontaneous.  He also developed rectal bleed during that time.  He did not stay and signed out AMA.  He then developed chest pain and went to the emergency department where CT scan showed dissection appeared stable.  Before more investigation was done he again signed out AMA.  For GI issues he followed with Dr. Hatfield and requested cardiac clearance to undergo EGD and colonoscopy.  On 10/22/2020 when he underwent a Lexiscan stress test and appeared to have diaphragmatic attenuation but could not rule out old infarct.  Plan was to proceed with cardiac catheterization if chest tightness persisted.  Echocardiogram showed LVEF of 51-55%. On 11/3/2021 [facility] loop recorder revealed end-of-life therefore removal recommended. On 3/9/2022 he underwent cardiac catheterization due to reoccurrence chest pain. Ectatic left coronary system noted but no significant CAD.  EF was around 60%.  Medical management advised.  Due to low blood pressure changes were made to antihypertensive " "management.  L-arginine was added.    Today he returns to the office for a hospital follow-up visit.The patient is accompanied by an adult female. The patient had elevated blood pressure during encounter, of 150/80 mmHg.    The patient reports symptoms being similar to those before aortic dissection procedure was performed. The patient describes his symptoms as, \"you drive down the road and you go over little hill and it takes your stomach\", but that this sensation never stops, doesn't feel good, and hurts. The patient reports experiencing the pain initially in his head, that then travels down his body to his stomach, groin, and knees, and if he is not holding onto something he feels he would fall. The patient reports experiencing dizziness, faint, weakness, and heart rhythm fluttering. The patient is unable to determine if these symptoms are similar to atrial fibrillation symptoms. The patient reports frequently checking his blood pressure at-home and that it typically reads between 100s to 160s systolic since having cardiac catheter procedure performed. He also notes his blood pressure monitor frequently reports irregular heart beats.  The adult female reports the patient stating symptoms have worsened over the last week.    The patient had aneurysm repair performed by Antonio Garcia MD with [facility]. The patient admits that since the procedure he has not followed lifting restrictions, and feels he may have overdone it a few times.  In 01/2022 he had follow-up visit with surgeon and repair was found to be stable.      Interim History:  The patient reports experiencing dark-red blood with stool bowel movements, and states he is unsure if he has a follow-up visit regarding this issue. The patient reports experiencing shortness of breath with little exertion and difficulty swallowing.    Cardiac History:  The patient has a history of atrial fibrillation, and implantable loop recorder placement " that was recently removed due to battery end-of-life..    Past Surgical History:   Procedure Laterality Date   • CARDIAC CATHETERIZATION  04/26/2019    - Normal Coronaries. EF 55%   • CARDIAC CATHETERIZATION  03/09/2022    Ectatic Coronaries. Low BP and HR   • CARDIOVASCULAR STRESS TEST  03/26/2018    @ [facility]. Dr. Stringer- Adenosine- EF 42%. Inferior Infarct   • CARDIOVASCULAR STRESS TEST  10/22/2021    Lexiscan- EF 46%. Reverse Redistribution Inferior wall   • ECHO - CONVERTED  09/17/2021    @ . EF 57%. Trace MR   • ECHO - CONVERTED  03/27/2018    @ [facility]. - EF 60%. Trace MR   • ECHO - CONVERTED  10/22/2021    TLS. EF 55%. LA- 3.9 Cm. Trace-Mild MR. RVSP- 14 mmHg   • KNEE SURGERY     • OTHER SURGICAL HISTORY  10/02/2021    CTA- Patent Thoracic stent.AAA distal to stent   • OTHER SURGICAL HISTORY  09/17/2021    CTA- Dissection of thoracis aorta distal to (L) Subclavian artery   • OTHER SURGICAL HISTORY  06/04/2018    Loop recorder placement   • OTHER SURGICAL HISTORY  09/22/2021    @ . Thoracic endovascular stent   • OTHER SURGICAL HISTORY  11/08/2021    Pulse O2- Borderline       Current Outpatient Medications   Medication Sig Dispense Refill   • Arginine 1000 MG tablet Take 1 tablet by mouth 2 (Two) Times a Day.     • isosorbide dinitrate (ISORDIL) 20 MG tablet Take 1 tablet by mouth 3 (Three) Times a Day. 270 tablet 3   • lisinopril (PRINIVIL,ZESTRIL) 10 MG tablet Take 1 tablet by mouth Daily. 90 tablet 3   • metoprolol succinate XL (TOPROL-XL) 50 MG 24 hr tablet Take 50 mg by mouth Daily.     • nicotine (Nicotrol) 10 MG inhaler Inhale 1 puff As Needed for Smoking Cessation. 168 each 6   • NIFEdipine CC (ADALAT CC) 30 MG 24 hr tablet Take 30 mg by mouth Daily.     • pantoprazole (Protonix) 40 MG EC tablet Take 1 tablet by mouth Daily. 30 tablet 6     No current facility-administered medications for this visit.       Patient has no known allergies.    Past Medical History:   Diagnosis Date    • Arthritis    • Atrial fibrillation (HCC)    • Dissecting AAA (abdominal aortic aneurysm) (HCC)    • Elevated cholesterol    • GERD (gastroesophageal reflux disease)    • Hypertension    • Myocardial infarct (HCC)        Social History     Socioeconomic History   • Marital status: Single   Tobacco Use   • Smoking status: Current Every Day Smoker     Packs/day: 0.25     Types: Cigarettes   • Smokeless tobacco: Never Used   • Tobacco comment: trying to quit, would like to try Chantix   Vaping Use   • Vaping Use: Never used   Substance and Sexual Activity   • Alcohol use: Not Currently   • Drug use: Yes     Types: Marijuana     Comment: smokes marijuana daily   • Sexual activity: Defer       Family History   Problem Relation Age of Onset   • Heart disease Mother    • Other Father          in accident at 42   • Heart disease Sister    • Other Brother         PPM   • Heart disease Brother    • Other Other         grandparents medical history unknown   • No Known Problems Sister    • No Known Problems Brother        Review of Systems   Constitutional: Positive for malaise/fatigue. Negative for diaphoresis and fever.   HENT: Negative for congestion and nosebleeds.    Eyes: Negative for visual disturbance.   Cardiovascular: Positive for irregular heartbeat, near-syncope and palpitations. Negative for leg swelling.   Respiratory: Positive for shortness of breath. Negative for sleep disturbances due to breathing.    Endocrine: Negative for polydipsia, polyphagia and polyuria.   Hematologic/Lymphatic: Negative for adenopathy and bleeding problem. Does not bruise/bleed easily.   Musculoskeletal: Positive for joint pain and myalgias. Negative for falls.   Gastrointestinal: Positive for melena. Negative for bloating, constipation, dysphagia, hemorrhoids (he is unsure) and jaundice.   Genitourinary: Negative for dysuria and hematuria.   Neurological: Positive for difficulty with concentration and weakness (episodes).  "  Psychiatric/Behavioral: Positive for memory loss. The patient has insomnia (at times) and is nervous/anxious.         BP Readings from Last 5 Encounters:   04/13/22 150/80   10/13/21 130/80   04/10/19 130/75       Wt Readings from Last 5 Encounters:   04/13/22 99.9 kg (220 lb 3.2 oz)   10/22/21 98 kg (216 lb 0.8 oz)   10/13/21 98 kg (216 lb)   04/10/19 113 kg (250 lb)       Objective     /80 (BP Location: Right arm)   Pulse 88   Ht 177.8 cm (70\")   Wt 99.9 kg (220 lb 3.2 oz)   BMI 31.60 kg/m²     Vitals and nursing note reviewed.   Constitutional:       Appearance: Not in distress.   Eyes:      Pupils: Pupils are equal, round, and reactive to light.   HENT:      Head: Normocephalic.   Neck:      Vascular: No carotid bruit or JVD.   Pulmonary:      Effort: Pulmonary effort is normal.      Breath sounds: Normal breath sounds.   Cardiovascular:      Normal rate. Regular rhythm. S2. Slightly loud       Murmurs: There is no murmur.   Pulses:     Intact distal pulses.   Edema:     Peripheral edema absent.   Abdominal:      General: Bowel sounds are normal.      Palpations: Abdomen is soft.   Musculoskeletal: Normal range of motion.      Cervical back: Normal range of motion. Skin:     General: Skin is warm.   Neurological:      Mental Status: Alert and oriented to person, place, and time.   Psychiatric:         Mood and Affect: Mood is anxious.         Speech: Speech normal.         Behavior: Behavior is cooperative.         Cognition and Memory: Exhibits impaired recent memory.            ECG 12 Lead    Date/Time: 4/13/2022 4:30 PM  Performed by: Ana Rosa Mayorga APRN  Authorized by: Ana Rosa Mayorga APRN   Comparison: compared with previous ECG from 1/24/2022  Comparison to previous ECG: Previous sinus elliott  Rhythm: sinus rhythm  BPM: 88  Other findings comments: left atrial enlargement                   Assessment/Plan   Diagnoses and all orders for this visit:    1. PAF (paroxysmal atrial fibrillation) " (HCC) (Primary)  -     Holter Monitor - 72 Hour Up To 15 Days; Future    2. Palpitations  -     Holter Monitor - 72 Hour Up To 15 Days; Future    3. Primary hypertension    4. Dissection of thoracoabdominal aorta (HCC)    5. Near syncope  -     Holter Monitor - 72 Hour Up To 15 Days; Future    6. Melena    7. Memory loss    Other orders  -     ECG 12 Lead    We reviewed the results of his recent cardiac catheterization that showed ectatic but normal coronary arteries.  Cardiac output was normal.  Continue L-arginine.    Hypertension monitoring.  - The patient had an elevated blood pressure reading at encounter, of 150/80 mmHg.  - The patient will continue current medications,   - Highly advised patient to continue to checking pressure measurements at-home. Instructed patient to perform blood pressure readings daily to include time of day.  Also instructed patient to document when blood pressure monitor reports an irregular heart beat. Then bring these measurements with him to his follow-up visit in 3-month. He understands to call sooner for concerns.   - Advised patient to be on the look-out for chest pressure and tightness symptoms developing more frequently, giles with exertion and to monitor BP/HR at these times.   -hypertension informational handout given.   -DASH diet encouraged.     Medication management  -patient did not bring in medication list or bottles. We obtained records from recent hospitalization for cardiac cath. I instructed patient and family member to validate medication list provided at check out and to call back to the office if any corrections need to be made.     PAF/palpitations/near syncope  External Holter monitor.  - patient given handout on heart palpitations and triggers to avoid.,   - 14-day day cardiac monitor placed at this time.    Melena  -Patient has followed with gastroenterologist/Dr. Segovia in the past.  I advised him to contact their office regarding persistent issues with bloody  stool.  If he is unable to get appointment I advised him to call our office and referral be made or to contact you in regards.    Aneurysm follow-up.  - Advised patient to contact Dr. Garcia's office to ensure follow up visit.     Memory loss  - Patient admits to having problems remembering in general.  I am unsure if this is related to history of drug abuse or ongoing marijuana use.  I advised him to further discuss this issue with you.  He may benefit in referral to neurologist.    Follow-up visit in 3-months.            Transcribed from ambient dictation for TOBY Fleming by TOBY Fleming and Mehreen Montalvo.  04/13/22   12:04 EDT    Patient verbalized consent to the visit recording.

## 2022-07-20 ENCOUNTER — OFFICE VISIT (OUTPATIENT)
Dept: CARDIOLOGY | Facility: CLINIC | Age: 47
End: 2022-07-20

## 2022-07-20 VITALS
WEIGHT: 226.2 LBS | HEIGHT: 70 IN | HEART RATE: 65 BPM | SYSTOLIC BLOOD PRESSURE: 150 MMHG | BODY MASS INDEX: 32.38 KG/M2 | DIASTOLIC BLOOD PRESSURE: 70 MMHG

## 2022-07-20 DIAGNOSIS — I48.0 PAF (PAROXYSMAL ATRIAL FIBRILLATION): Primary | ICD-10-CM

## 2022-07-20 DIAGNOSIS — Z13.220 SCREENING CHOLESTEROL LEVEL: ICD-10-CM

## 2022-07-20 DIAGNOSIS — I10 PRIMARY HYPERTENSION: ICD-10-CM

## 2022-07-20 DIAGNOSIS — Z79.899 MEDICATION MANAGEMENT: ICD-10-CM

## 2022-07-20 DIAGNOSIS — F17.200 SMOKING: ICD-10-CM

## 2022-07-20 DIAGNOSIS — R00.2 PALPITATIONS: ICD-10-CM

## 2022-07-20 DIAGNOSIS — R07.2 PRECORDIAL PAIN: ICD-10-CM

## 2022-07-20 PROCEDURE — 93000 ELECTROCARDIOGRAM COMPLETE: CPT | Performed by: NURSE PRACTITIONER

## 2022-07-20 PROCEDURE — 99213 OFFICE O/P EST LOW 20 MIN: CPT | Performed by: NURSE PRACTITIONER

## 2022-07-20 RX ORDER — PANTOPRAZOLE SODIUM 40 MG/1
40 TABLET, DELAYED RELEASE ORAL DAILY
Qty: 90 TABLET | Refills: 3 | Status: SHIPPED | OUTPATIENT
Start: 2022-07-20

## 2022-07-20 RX ORDER — METOPROLOL SUCCINATE 50 MG/1
50 TABLET, EXTENDED RELEASE ORAL DAILY
Qty: 90 TABLET | Refills: 3 | Status: SHIPPED | OUTPATIENT
Start: 2022-07-20

## 2022-07-20 RX ORDER — NIFEDIPINE 30 MG/1
30 TABLET, FILM COATED, EXTENDED RELEASE ORAL DAILY
Qty: 90 TABLET | Refills: 3 | Status: SHIPPED | OUTPATIENT
Start: 2022-07-20

## 2022-07-20 RX ORDER — LISINOPRIL 10 MG/1
10 TABLET ORAL DAILY
Qty: 90 TABLET | Refills: 3 | Status: SHIPPED | OUTPATIENT
Start: 2022-07-20

## 2022-07-20 RX ORDER — ISOSORBIDE DINITRATE 20 MG/1
20 TABLET ORAL 3 TIMES DAILY
Qty: 270 TABLET | Refills: 3 | Status: SHIPPED | OUTPATIENT
Start: 2022-07-20

## 2022-07-20 NOTE — PROGRESS NOTES
Chief Complaint   Patient presents with   • Follow-up     Cardiac management    • Atrial Fibrillation     Reports he has had frequent episodes of AFIB , but said he isn't taking med's regular . Said he does not get all at the same time from pharmacy and can not remember  what to take .    • LABS     Results on chart January 2022   • Shortness of Breath     Reports  he has SOA and dizziness. He adds that he has chest pain at times . He does have  oxygen for nighttime use . He does not wear it much at night , d/t getting tangled in cannula tubing . He does wear it during daytime hours .   • Fatigue     Reports he stays tired and fatigued   • Med Refill     He is switching to [facility] and would like all  Cardiac meds sent . He has not saw PCP since last year . He is trying to switch care to [facility] .       Linda Gordillo is a 47 y.o. male  seen October 2021 to establish cardiac care.  He has hypertension and history of syncopal episodes.  He had undergone investigation including cardiac catheterization.  Prior to consultation he had developed acute dissection involving the thoracic aorta and was referred to [place] where he underwent endovascular stenting.  He was also noted to be in atrial fibrillation and apparently converted to sinus spontaneous.  He also developed rectal bleed during that time.  He did not stay and signed out AMA.  He then developed chest pain and went to the emergency department where CT scan showed dissection appeared stable.  Before more investigation was done he again signed out AMA.  For GI issues he followed with Dr. Hatfield and requested cardiac clearance to undergo EGD and colonoscopy.  On 10/22/2020 when he underwent a Lexiscan stress test and appeared to have diaphragmatic attenuation but could not rule out old infarct.  Plan was to proceed with cardiac catheterization if chest tightness persisted.  Echocardiogram showed LVEF of 51-55%. On 11/3/2021 [facility] loop recorder revealed end-of-life therefore removal recommended. On 3/9/2022 he underwent cardiac catheterization due to reoccurrence chest pain. Ectatic left coronary system noted but no significant CAD.  EF was around 60%.  Medical management advised.  Due to low blood pressure changes were made to antihypertensive management.  L-arginine was added    The patient is accompanied by an adult female.    The patient reports he continues to experience palpitations, and he occasionally forgets to take his medication. The patient reports that he has been experiencing shortness of breath, which he attributes to the heat and humidity. He tries to avoid lifting heavy objects.    The patient reports he has been able to cut back on his smoking. He states that he has been using vapes.    The adult female states that the patient's mother is in the hospital right now and had brain surgery and cancer.    Cardiac History:    Past Surgical History:   Procedure Laterality Date   • CARDIAC CATHETERIZATION  04/26/2019    - Normal Coronaries. EF 55%   • CARDIAC CATHETERIZATION  03/09/2022    Ectatic Coronaries. Low BP and HR   • CARDIOVASCULAR STRESS TEST  03/26/2018    @ [facility]. Dr. Stringer- Adenosine- EF 42%. Inferior Infarct   • CARDIOVASCULAR STRESS TEST  10/22/2021    Lexiscan- EF 46%. Reverse Redistribution Inferior wall   • ECHO - CONVERTED  09/17/2021    @ . EF 57%. Trace MR   • ECHO - CONVERTED  03/27/2018    @ [facility]. - EF 60%. Trace MR   • ECHO - CONVERTED  10/22/2021    TLS. EF 55%. LA- 3.9 Cm. Trace-Mild MR. RVSP- 14 mmHg   • KNEE SURGERY     • OTHER SURGICAL HISTORY  10/02/2021    CTA- Patent Thoracic stent.AAA distal to stent   • OTHER SURGICAL HISTORY  09/17/2021    CTA- Dissection of thoracis aorta distal to (L) Subclavian artery   • OTHER SURGICAL HISTORY  06/04/2018    Loop recorder placement   • OTHER SURGICAL HISTORY  09/22/2021    @ . Thoracic endovascular stent   • OTHER SURGICAL HISTORY   2021    Pulse O2- Borderline       Current Outpatient Medications   Medication Sig Dispense Refill   • Arginine 1000 MG tablet Take 1 tablet by mouth 2 (Two) Times a Day. 180 each 3   • isosorbide dinitrate (ISORDIL) 20 MG tablet Take 1 tablet by mouth 3 (Three) Times a Day. 270 tablet 3   • lisinopril (PRINIVIL,ZESTRIL) 10 MG tablet Take 1 tablet by mouth Daily. 90 tablet 3   • metoprolol succinate XL (TOPROL-XL) 50 MG 24 hr tablet Take 1 tablet by mouth Daily. 90 tablet 3   • NIFEdipine CC (ADALAT CC) 30 MG 24 hr tablet Take 1 tablet by mouth Daily. 90 tablet 3   • pantoprazole (Protonix) 40 MG EC tablet Take 1 tablet by mouth Daily. 90 tablet 3     No current facility-administered medications for this visit.       Patient has no known allergies.    Past Medical History:   Diagnosis Date   • Arthritis    • Atrial fibrillation (HCC)    • Dissecting AAA (abdominal aortic aneurysm) (HCC)    • Elevated cholesterol    • GERD (gastroesophageal reflux disease)    • Hypertension    • Myocardial infarct (HCC)        Social History     Socioeconomic History   • Marital status: Single   Tobacco Use   • Smoking status: Current Every Day Smoker     Packs/day: 0.25     Types: Cigarettes   • Smokeless tobacco: Never Used   • Tobacco comment: trying to quit, would like to try Chantix   Vaping Use   • Vaping Use: Never used   Substance and Sexual Activity   • Alcohol use: Not Currently   • Drug use: Yes     Types: Marijuana     Comment: smokes marijuana daily   • Sexual activity: Defer       Family History   Problem Relation Age of Onset   • Heart disease Mother    • Other Father          in accident at 42   • Heart disease Sister    • Other Brother         PPM   • Heart disease Brother    • Other Other         grandparents medical history unknown   • No Known Problems Sister    • No Known Problems Brother        Review of Systems   Constitutional: Negative for decreased appetite, diaphoresis and malaise/fatigue.  "  HENT: Negative for nosebleeds.    Eyes: Negative for blurred vision.   Cardiovascular: Positive for palpitations. Negative for chest pain, claudication, cyanosis, dyspnea on exertion, irregular heartbeat, leg swelling, near-syncope, orthopnea, paroxysmal nocturnal dyspnea and syncope.   Respiratory: Positive for shortness of breath.    Endocrine: Negative for cold intolerance and heat intolerance.   Hematologic/Lymphatic: Does not bruise/bleed easily.   Skin: Negative for rash.   Musculoskeletal: Negative for myalgias.   Gastrointestinal: Negative for heartburn, melena and nausea.   Genitourinary: Negative for dysuria and hematuria.   Neurological: Negative for dizziness and light-headedness.   Psychiatric/Behavioral: Positive for depression and memory loss. Negative for suicidal ideas. The patient is nervous/anxious.         BP Readings from Last 5 Encounters:   07/20/22 150/70   04/13/22 150/80   10/13/21 130/80   04/10/19 130/75       Wt Readings from Last 5 Encounters:   07/20/22 103 kg (226 lb 3.2 oz)   04/13/22 99.9 kg (220 lb 3.2 oz)   10/22/21 98 kg (216 lb 0.8 oz)   10/13/21 98 kg (216 lb)   04/10/19 113 kg (250 lb)       Objective     /70 (BP Location: Left arm)   Pulse 65   Ht 177.8 cm (70\")   Wt 103 kg (226 lb 3.2 oz)   BMI 32.46 kg/m²     Vitals and nursing note reviewed.   Constitutional:       Appearance: Not in distress.   Eyes:      Pupils: Pupils are equal, round, and reactive to light.   HENT:      Head: Normocephalic.   Pulmonary:      Breath sounds: Wheezing present.   Cardiovascular:      Normal rate. Regular rhythm.   Pulses:     Intact distal pulses.   Edema:     Peripheral edema absent.   Abdominal:      General: Bowel sounds are normal.      Palpations: Abdomen is soft.   Musculoskeletal: Normal range of motion.      Cervical back: Normal range of motion. Skin:     General: Skin is warm.   Neurological:      Mental Status: Alert and oriented to person, place, and time.      "       ECG 12 Lead    Date/Time: 7/20/2022 9:31 AM  Performed by: Ana Rosa Mayorga APRN  Authorized by: Ana Rosa Mayorga APRN   Comparison: compared with previous ECG from 4/13/2022  Similar to previous ECG  Rhythm: sinus rhythm  BPM: 65                   Assessment & Plan   Diagnoses and all orders for this visit:    1. PAF (paroxysmal atrial fibrillation) (HCC) (Primary)  -     metoprolol succinate XL (TOPROL-XL) 50 MG 24 hr tablet; Take 1 tablet by mouth Daily.  Dispense: 90 tablet; Refill: 3  -     Comprehensive Metabolic Panel; Future  -     TSH; Future  -     ECG 12 Lead    2. Palpitations  -     Comprehensive Metabolic Panel; Future  -     TSH; Future    3. Primary hypertension  -     isosorbide dinitrate (ISORDIL) 20 MG tablet; Take 1 tablet by mouth 3 (Three) Times a Day.  Dispense: 270 tablet; Refill: 3  -     lisinopril (PRINIVIL,ZESTRIL) 10 MG tablet; Take 1 tablet by mouth Daily.  Dispense: 90 tablet; Refill: 3  -     metoprolol succinate XL (TOPROL-XL) 50 MG 24 hr tablet; Take 1 tablet by mouth Daily.  Dispense: 90 tablet; Refill: 3  -     NIFEdipine CC (ADALAT CC) 30 MG 24 hr tablet; Take 1 tablet by mouth Daily.  Dispense: 90 tablet; Refill: 3    4. Smoking    5. Precordial pain  -     Arginine 1000 MG tablet; Take 1 tablet by mouth 2 (Two) Times a Day.  Dispense: 180 each; Refill: 3  -     isosorbide dinitrate (ISORDIL) 20 MG tablet; Take 1 tablet by mouth 3 (Three) Times a Day.  Dispense: 270 tablet; Refill: 3  -     pantoprazole (Protonix) 40 MG EC tablet; Take 1 tablet by mouth Daily.  Dispense: 90 tablet; Refill: 3    6. Medication management  -     CBC (No Diff); Future  -     Comprehensive Metabolic Panel; Future  -     Lipid Panel; Future  -     TSH; Future    7. Screening cholesterol level  -     Lipid Panel; Future    Other orders  -     Cancel: Full Pulmonary Function Test With Bronchodilator          PAF/medication management  - EKG sinus rhythm.  We discussed the importance of taking  medication routinely to avoid palpitations.  - Continue current current dose Toprol 50 mg daily.  - I updated refills to the new pharmacy.  - If the palpitations worsen or new symptoms develop, will call in sooner.    Hypertension  -Continue lisinopril and nifedipine  -DASH diet encouraged.    Smoking  -I encouraged patient on avoiding cigarette smoking as well as vaping.  Informational handouts provided.    Medication management  -Lab order given as noted above.    A 6-month follow-up visit scheduled.  Please call sooner for cardiac concerns.           Transcribed from ambient dictation for TOBY Fleming by Celena Bowers.  07/20/22   13:31 EDT    Patient verbalized consent to the visit recording.

## 2023-02-02 ENCOUNTER — TELEPHONE (OUTPATIENT)
Dept: CARDIOLOGY | Facility: CLINIC | Age: 48
End: 2023-02-02
Payer: COMMERCIAL

## 2023-02-02 NOTE — TELEPHONE ENCOUNTER
Attempted to call patient but the phone was disconnected. Then attempted to call their sister who was their emergency contact and also contact on the  verbal release form but that is no longer their number. Will be sending them a letter since there is no way to contact them otherwise.

## 2023-04-05 ENCOUNTER — TELEPHONE (OUTPATIENT)
Dept: CARDIOLOGY | Facility: CLINIC | Age: 48
End: 2023-04-05
Payer: COMMERCIAL

## 2023-04-05 NOTE — TELEPHONE ENCOUNTER
Caller: Jorge A Gordillo    Relationship: Self    Best call back number: [phone]    What is the best time to reach you: ANY    Who are you requesting to speak with (clinical staff, provider,  specific staff member): CLINICAL      What was the call regarding: PT IS CALLING TO SCHEDULE AN APPT WITH [facility], [facility] HAS UPDATED HIS CURRENT INFORMATION WITH A NEW PHONE AND EMERGENCY CONTACT. PLEASE REACH AT PREFERRED NUMBER TO SCHEDULE.     Do you require a callback: YES

## 2023-05-12 ENCOUNTER — TELEPHONE (OUTPATIENT)
Dept: CARDIOLOGY | Facility: CLINIC | Age: 48
End: 2023-05-12
Payer: COMMERCIAL

## 2023-05-12 NOTE — TELEPHONE ENCOUNTER
Caller: PAZ    Relationship to patient: Significant Other    Best call back number: [phone]    Chief complaint: NEEDS SOONER APPT B/C OF ED VISIT    Type of visit: F/U    Requested date: ASAP    If rescheduling, when is the original appointment: 5/18/23    Additional notes: PAZ, WHO IS NOT ON  VERB BUT I DID GET VERBAL PERMISSION FROM THIS PT, CALLED IN TO TELL US THAT THIS PT WENT TO THE [place] ED ON 5/9/23, WAS FLOWN FROM THERE TO  HOS ON 5/10/23, FELT LIKE HE WAS NOT GETTING THE APPROPRIATE CARE AT , GOT FRUSTRATED AND LEFT. HE HAS AN APPT ON 5/18/23 BUT WAS SEEING IF SOMETHING WAS AVAILABLE SOONER. THIS PT IS STILL IN A LOT OF PAIN, PT HAD TAKEN A WALK AND CAME BACK TO THE HOUSE WITH EXTREME STOMACH PAIN, HAS STINTS IN HEART AND THEY HAVE RUPTURED AGAIN PER [place] [facility]

## 2023-05-18 ENCOUNTER — OFFICE VISIT (OUTPATIENT)
Dept: CARDIOLOGY | Facility: CLINIC | Age: 48
End: 2023-05-18
Payer: COMMERCIAL

## 2023-05-18 VITALS
HEIGHT: 70 IN | SYSTOLIC BLOOD PRESSURE: 130 MMHG | DIASTOLIC BLOOD PRESSURE: 66 MMHG | HEART RATE: 68 BPM | BODY MASS INDEX: 32.7 KG/M2 | WEIGHT: 228.4 LBS

## 2023-05-18 DIAGNOSIS — I10 PRIMARY HYPERTENSION: ICD-10-CM

## 2023-05-18 DIAGNOSIS — R53.1 GENERALIZED WEAKNESS: ICD-10-CM

## 2023-05-18 DIAGNOSIS — R42 DIZZINESS: ICD-10-CM

## 2023-05-18 DIAGNOSIS — I71.03 DISSECTION OF THORACOABDOMINAL AORTA: ICD-10-CM

## 2023-05-18 DIAGNOSIS — R00.2 PALPITATIONS: ICD-10-CM

## 2023-05-18 DIAGNOSIS — I48.0 PAF (PAROXYSMAL ATRIAL FIBRILLATION): Primary | ICD-10-CM

## 2023-05-18 DIAGNOSIS — R55 NEAR SYNCOPE: ICD-10-CM

## 2023-05-18 DIAGNOSIS — F17.200 SMOKING: ICD-10-CM

## 2023-05-18 PROCEDURE — 3075F SYST BP GE 130 - 139MM HG: CPT | Performed by: NURSE PRACTITIONER

## 2023-05-18 PROCEDURE — 3078F DIAST BP <80 MM HG: CPT | Performed by: NURSE PRACTITIONER

## 2023-05-18 PROCEDURE — 1160F RVW MEDS BY RX/DR IN RCRD: CPT | Performed by: NURSE PRACTITIONER

## 2023-05-18 PROCEDURE — 1159F MED LIST DOCD IN RCRD: CPT | Performed by: NURSE PRACTITIONER

## 2023-05-18 PROCEDURE — 99214 OFFICE O/P EST MOD 30 MIN: CPT | Performed by: NURSE PRACTITIONER

## 2023-05-18 PROCEDURE — 93000 ELECTROCARDIOGRAM COMPLETE: CPT | Performed by: NURSE PRACTITIONER

## 2023-05-18 NOTE — PROGRESS NOTES
Chief Complaint   Patient presents with   • Follow-up     Cardiac management   • Dizziness     Has dizziness with standing .   • Chest Pain     Describe sharp pain that comes and goes. Is becoming more frequent   • LABS     Current labs  on chart   • Atrial Fibrillation     Has frequent episode of AFIB   • Nicotine Dependence     Would like to discuss options for quitting smoking .   • Med Refill     Needs refills on all  medications 90 day supply to [facility] .       Subjective       Jorge A Gordillo is a 48 y.o. male seen October 2021 to establish cardiac care.  He has hypertension and history of syncopal episodes.  He had undergone investigation including cardiac catheterization.  Prior to consultation he had developed acute dissection involving the thoracic aorta and was referred to [facility] where he underwent endovascular stenting.  He was also noted to be in atrial fibrillation and apparently converted to sinus spontaneous.  He also developed rectal bleed during that time.  He did not stay and signed out AMA.  He then developed chest pain and went to the emergency department where CT scan showed dissection appeared stable.  Before more investigation was done he again signed out AMA.  For GI issues he followed with Dr. Hatfield and requested cardiac clearance to undergo EGD and colonoscopy.  On 10/22/2020 when he underwent a Lexiscan stress test and appeared to have diaphragmatic attenuation but could not rule out old infarct.  Plan was to proceed with cardiac catheterization if chest tightness persisted.  Echocardiogram showed LVEF of 51-55%. On 11/3/2021 [facility] loop recorder revealed end-of-life therefore removal recommended. On 3/9/2022 he underwent cardiac catheterization due to reoccurrence chest pain. Ectatic left coronary system noted but no significant CAD.  EF was around 60%.  Medical management advised.  Due to low blood pressure changes were made to antihypertensive management.  L-arginine was  added.    In April 2023 he was seen by general surgery at  for routine follow-up regarding aortic dissection s/p TEVAR on 9/22/2021.  CT images done 2/2/2023 showed stable repair, left common iliac aneurysm with increase in total diameter to 25 mm from 22 mm.  Plan is to follow up in 1 year with repeat CTA.  Patient advised to take aspirin, statin, maintain good blood pressure control, and smoking cessation.  He also has RUL nodule advised to see PCP in regards.    On 5/10/2023 he was sent to  from [facility] with chest pain/epigastric pain on Cardene drip. Vascular surgeries consulted to evaluate aortic dissection.  CTA obtained and reviewed, noted to be stable.  No acute findings in abdomen or chest noted.  There was low concern for ACS or GI etiology.  He was admitted for observation of pain and improvement along some impulse control.  Due to epigastric pain GI was consulted.  However patient became upset and left AMA.    Today he returns to the office for a follow-up visit.  He was seen by his PCP yesterday and admits to having ultrasound scheduled, unsure of exact order.  He plans to return tomorrow for further evaluation.  His main complaints today are heart palpitations that are occurring daily, episodes of dizziness and increased anxiety.    Cardiac History:    Past Surgical History:   Procedure Laterality Date   • CARDIAC CATHETERIZATION  04/26/2019    - Normal Coronaries. EF 55%   • CARDIAC CATHETERIZATION  03/09/2022    Ectatic Coronaries. Low BP and HR   • CARDIOVASCULAR STRESS TEST  03/26/2018    @ [facility]. Dr. Stringer- Adenosine- EF 42%. Inferior Infarct   • CARDIOVASCULAR STRESS TEST  10/22/2021    Lexiscan- EF 46%. Reverse Redistribution Inferior wall   • ECHO - CONVERTED  09/17/2021    @ . EF 57%. Trace MR   • ECHO - CONVERTED  03/27/2018    @ [facility]. - EF 60%. Trace MR   • ECHO - CONVERTED  10/22/2021    TLS. EF 55%. LA- 3.9 Cm. Trace-Mild MR. RVSP- 14  mmHg   • KNEE SURGERY     • OTHER SURGICAL HISTORY  10/02/2021    CTA- Patent Thoracic stent.AAA distal to stent   • OTHER SURGICAL HISTORY  09/17/2021    CTA- Dissection of thoracis aorta distal to (L) Subclavian artery   • OTHER SURGICAL HISTORY  06/04/2018    Loop recorder placement   • OTHER SURGICAL HISTORY  09/22/2021    @ [facility]. Thoracic endovascular stent   • OTHER SURGICAL HISTORY  11/08/2021    Pulse O2- Borderline       Current Outpatient Medications   Medication Sig Dispense Refill   • isosorbide dinitrate (ISORDIL) 20 MG tablet Take 1 tablet by mouth 3 (Three) Times a Day. 270 tablet 3   • lisinopril (PRINIVIL,ZESTRIL) 10 MG tablet Take 1 tablet by mouth Daily. 90 tablet 3   • metoprolol succinate XL (TOPROL-XL) 50 MG 24 hr tablet Take 1 tablet by mouth Daily. 90 tablet 3   • NIFEdipine CC (ADALAT CC) 30 MG 24 hr tablet Take 1 tablet by mouth Daily. 90 tablet 3   • pantoprazole (Protonix) 40 MG EC tablet Take 1 tablet by mouth Daily. 90 tablet 3   • Arginine 1000 MG tablet Take 1 tablet by mouth 2 (Two) Times a Day. (Patient not taking: Reported on 5/18/2023) 180 each 3     No current facility-administered medications for this visit.       Patient has no known allergies.    Past Medical History:   Diagnosis Date   • Arthritis    • Atrial fibrillation    • Dissecting AAA (abdominal aortic aneurysm)    • Elevated cholesterol    • GERD (gastroesophageal reflux disease)    • Hypertension    • Myocardial infarct        Social History     Socioeconomic History   • Marital status: Single   Tobacco Use   • Smoking status: Every Day     Packs/day: 0.25     Types: Cigarettes   • Smokeless tobacco: Never   • Tobacco comments:     trying to quit, would like to try Chantix   Vaping Use   • Vaping Use: Never used   Substance and Sexual Activity   • Alcohol use: Not Currently   • Drug use: Yes     Types: Marijuana     Comment: smokes marijuana daily   • Sexual activity: Defer       Family History   Problem Relation Age  "of Onset   • Heart disease Mother    • Other Father          in accident at 42   • Heart disease Sister    • Other Brother         PPM   • Heart disease Brother    • Other Other         grandparents medical history unknown   • No Known Problems Sister    • No Known Problems Brother        Review of Systems   Constitutional: Positive for malaise/fatigue. Negative for diaphoresis and fever.   HENT: Negative for nosebleeds.    Cardiovascular: Positive for near-syncope and palpitations. Negative for leg swelling.   Respiratory: Positive for shortness of breath. Negative for sleep disturbances due to breathing.    Endocrine: Negative for polydipsia, polyphagia and polyuria.   Hematologic/Lymphatic: Negative for bleeding problem. Does not bruise/bleed easily.   Skin: Negative for color change.   Musculoskeletal: Negative for falls.   Gastrointestinal: Negative for abdominal pain, heartburn and melena.   Genitourinary: Negative for dysuria and hematuria.   Neurological: Positive for dizziness, light-headedness, loss of balance and weakness.   Psychiatric/Behavioral: Positive for depression and substance abuse (Currently marijuana). Negative for altered mental status and suicidal ideas. The patient is nervous/anxious.         BP Readings from Last 5 Encounters:   23 130/66   22 150/70   22 150/80   10/13/21 130/80   04/10/19 130/75       Wt Readings from Last 5 Encounters:   23 104 kg (228 lb 6.4 oz)   22 103 kg (226 lb 3.2 oz)   22 99.9 kg (220 lb 3.2 oz)   10/22/21 98 kg (216 lb 0.8 oz)   10/13/21 98 kg (216 lb)       Objective     /66 (BP Location: Left arm, Patient Position: Sitting)   Pulse 68   Ht 177.8 cm (70\")   Wt 104 kg (228 lb 6.4 oz)   BMI 32.77 kg/m²     Vitals and nursing note reviewed.   Constitutional:       Appearance: Healthy appearance. Not in distress.   Eyes:      Conjunctiva/sclera: Conjunctivae normal.      Pupils: Pupils are equal, round, and reactive " to light.   HENT:      Head: Normocephalic.   Neck:      Vascular: Carotid bruit (Questionable on left) present.   Pulmonary:      Effort: Pulmonary effort is normal.      Breath sounds: Normal breath sounds.   Cardiovascular:      PMI at left midclavicular line. Normal rate. Regular rhythm. loud S2.   Pulses:     Intact distal pulses.   Edema:     Peripheral edema absent.   Abdominal:      General: Bowel sounds are normal.      Palpations: Abdomen is soft.   Musculoskeletal: Normal range of motion.      Cervical back: Normal range of motion and neck supple. Skin:     General: Skin is warm and dry.   Neurological:      Mental Status: Alert, oriented to person, place, and time and oriented to person, place and time.      Gait: Gait is intact.   Psychiatric:         Attention and Perception: Attention normal.         Mood and Affect: Mood is anxious (Mildly).         Behavior: Behavior is cooperative.         Thought Content: Thought content is paranoid.         Cognition and Memory: Cognition normal.            ECG 12 Lead    Date/Time: 5/18/2023 4:12 PM  Performed by: Ana Rosa Mayorga APRN  Authorized by: Ana Rosa Mayorga APRN   Comparison: compared with previous ECG from 7/20/2022  Similar to previous ECG  Rhythm: sinus rhythm  BPM: 68  Q waves: V1 and V2                     Assessment & Plan   Diagnoses and all orders for this visit:    1. PAF (paroxysmal atrial fibrillation) (Primary)  -     Holter Monitor - 72 Hour Up To 15 Days; Future  -     Adult Transthoracic Echo Complete W/ Cont if Necessary Per Protocol; Future  -     ECG 12 Lead    2. Palpitations  -     Holter Monitor - 72 Hour Up To 15 Days; Future  -     Adult Transthoracic Echo Complete W/ Cont if Necessary Per Protocol; Future  -     ECG 12 Lead    3. Dissection of thoracoabdominal aorta    4. Primary hypertension    5. Dizziness  -     Holter Monitor - 72 Hour Up To 15 Days; Future  -     Adult Transthoracic Echo Complete W/ Cont if Necessary Per  Protocol; Future  -     US Carotid Bilateral; Future    6. Generalized weakness    7. Near syncope    8. Smoking      PAF/palpitations  -EKG sinus rhythm with Q waves in V1 and 2, similar to prior  -Patient admits to daily palpitations  -72-hour cardiac monitor ordered  -Continue Toprol-XL 50 mg daily  - Avoid caffeine and maintain good hydration    Dissection of thoracoabdominal aorta with stenting by history  -Follows with general surgeons/vascular at   -Patient admits has CTA scheduled and follow-up visit next month.  -Maintain good blood pressure control  -Smoking cessation strongly encouraged    Hypertension  -BP stable  -Continue isosorbide dinitrate 20 mg 3 times daily, lisinopril 10 mg daily, Toprol-XL 50 mg daily, nifedipine 30 mg daily    Dizziness-generalized weakness  - Questionable carotid bruit on left.  Carotid ultrasound ordered  -Echocardiogram ordered to relook at cardiac status specifically LVH function and PA pressure.    Smoking  -Patient ask about taking Chantix.  Due to potential side effects I did not prescribe at this time.  -Advised patient to further discuss with you as you could provide closer monitoring.    Anxiety  - Patient plans to further discuss with you at follow-up visit tomorrow.    Further recommendations based on cardiac monitor, echocardiogram, and carotid ultrasound.    6-month follow-up visit scheduled.

## 2023-06-14 ENCOUNTER — HOSPITAL ENCOUNTER (OUTPATIENT)
Dept: CARDIOLOGY | Facility: HOSPITAL | Age: 48
Discharge: HOME | End: 2023-06-14
Payer: COMMERCIAL

## 2023-06-14 ENCOUNTER — LAB (OUTPATIENT)
Dept: LAB | Facility: HOSPITAL | Age: 48
End: 2023-06-14
Payer: COMMERCIAL

## 2023-06-14 DIAGNOSIS — Z79.899 MEDICATION MANAGEMENT: ICD-10-CM

## 2023-06-14 DIAGNOSIS — Z13.220 SCREENING CHOLESTEROL LEVEL: ICD-10-CM

## 2023-06-14 DIAGNOSIS — R00.2 PALPITATIONS: ICD-10-CM

## 2023-06-14 DIAGNOSIS — R42 DIZZINESS: ICD-10-CM

## 2023-06-14 DIAGNOSIS — I48.0 PAF (PAROXYSMAL ATRIAL FIBRILLATION): ICD-10-CM

## 2023-06-14 LAB
ALBUMIN SERPL-MCNC: 4.4 G/DL (ref 3.5–5.2)
ALBUMIN/GLOB SERPL: 1.5 G/DL
ALP SERPL-CCNC: 80 U/L (ref 39–117)
ALT SERPL W P-5'-P-CCNC: 11 U/L (ref 1–41)
ANION GAP SERPL CALCULATED.3IONS-SCNC: 13.2 MMOL/L (ref 5–15)
AST SERPL-CCNC: 14 U/L (ref 1–40)
BH CV ECHO MEAS - ACS: 2.9 CM
BH CV ECHO MEAS - AO MAX PG: 4 MMHG
BH CV ECHO MEAS - AO MEAN PG: 2.46 MMHG
BH CV ECHO MEAS - AO ROOT DIAM: 3.6 CM
BH CV ECHO MEAS - AO V2 MAX: 100.4 CM/SEC
BH CV ECHO MEAS - AO V2 VTI: 24.4 CM
BH CV ECHO MEAS - EDV(CUBED): 122.8 ML
BH CV ECHO MEAS - EDV(MOD-SP4): 118 ML
BH CV ECHO MEAS - EF(MOD-SP4): 44.9 %
BH CV ECHO MEAS - EF_3D-VOL: 70 %
BH CV ECHO MEAS - ESV(CUBED): 18.8 ML
BH CV ECHO MEAS - ESV(MOD-SP4): 65 ML
BH CV ECHO MEAS - FS: 46.5 %
BH CV ECHO MEAS - IVS/LVPW: 1.04 CM
BH CV ECHO MEAS - IVSD: 1.26 CM
BH CV ECHO MEAS - LA DIMENSION: 3.3 CM
BH CV ECHO MEAS - LAT PEAK E' VEL: 8.7 CM/SEC
BH CV ECHO MEAS - LV DIASTOLIC VOL/BSA (35-75): 53.5 CM2
BH CV ECHO MEAS - LV MASS(C)D: 241.1 GRAMS
BH CV ECHO MEAS - LV SYSTOLIC VOL/BSA (12-30): 29.5 CM2
BH CV ECHO MEAS - LVIDD: 5 CM
BH CV ECHO MEAS - LVIDS: 2.7 CM
BH CV ECHO MEAS - LVPWD: 1.21 CM
BH CV ECHO MEAS - MED PEAK E' VEL: 8.2 CM/SEC
BH CV ECHO MEAS - MV A MAX VEL: 76.3 CM/SEC
BH CV ECHO MEAS - MV DEC SLOPE: 370.6 CM/SEC2
BH CV ECHO MEAS - MV E MAX VEL: 79.3 CM/SEC
BH CV ECHO MEAS - MV E/A: 1.04
BH CV ECHO MEAS - RAP SYSTOLE: 10 MMHG
BH CV ECHO MEAS - RVDD: 3.1 CM
BH CV ECHO MEAS - RVSP: 29.6 MMHG
BH CV ECHO MEAS - SI(MOD-SP4): 24 ML/M2
BH CV ECHO MEAS - SV(MOD-SP4): 53 ML
BH CV ECHO MEAS - TR MAX PG: 19.6 MMHG
BH CV ECHO MEAS - TR MAX VEL: 221.3 CM/SEC
BH CV ECHO MEASUREMENTS AVERAGE E/E' RATIO: 9.38
BILIRUB SERPL-MCNC: 0.4 MG/DL (ref 0–1.2)
BUN SERPL-MCNC: 20 MG/DL (ref 6–20)
BUN/CREAT SERPL: 14 (ref 7–25)
CALCIUM SPEC-SCNC: 9.6 MG/DL (ref 8.6–10.5)
CHLORIDE SERPL-SCNC: 103 MMOL/L (ref 98–107)
CHOLEST SERPL-MCNC: 134 MG/DL (ref 0–200)
CO2 SERPL-SCNC: 23.8 MMOL/L (ref 22–29)
CREAT SERPL-MCNC: 1.43 MG/DL (ref 0.76–1.27)
DEPRECATED RDW RBC AUTO: 47.8 FL (ref 37–54)
EGFRCR SERPLBLD CKD-EPI 2021: 60.1 ML/MIN/1.73
ERYTHROCYTE [DISTWIDTH] IN BLOOD BY AUTOMATED COUNT: 15 % (ref 12.3–15.4)
GLOBULIN UR ELPH-MCNC: 3 GM/DL
GLUCOSE SERPL-MCNC: 101 MG/DL (ref 65–99)
HCT VFR BLD AUTO: 44.7 % (ref 37.5–51)
HDLC SERPL-MCNC: 50 MG/DL (ref 40–60)
HGB BLD-MCNC: 14.3 G/DL (ref 13–17.7)
LDLC SERPL CALC-MCNC: 70 MG/DL (ref 0–100)
LDLC/HDLC SERPL: 1.41 {RATIO}
LEFT ATRIUM VOLUME INDEX: 16.8 ML/M2
MCH RBC QN AUTO: 28.2 PG (ref 26.6–33)
MCHC RBC AUTO-ENTMCNC: 32 G/DL (ref 31.5–35.7)
MCV RBC AUTO: 88.2 FL (ref 79–97)
PLATELET # BLD AUTO: 139 10*3/MM3 (ref 140–450)
PMV BLD AUTO: 11.9 FL (ref 6–12)
POTASSIUM SERPL-SCNC: 4.4 MMOL/L (ref 3.5–5.2)
PROT SERPL-MCNC: 7.4 G/DL (ref 6–8.5)
RBC # BLD AUTO: 5.07 10*6/MM3 (ref 4.14–5.8)
SODIUM SERPL-SCNC: 140 MMOL/L (ref 136–145)
TRIGL SERPL-MCNC: 67 MG/DL (ref 0–150)
TSH SERPL DL<=0.05 MIU/L-ACNC: 1.15 UIU/ML (ref 0.27–4.2)
VLDLC SERPL-MCNC: 14 MG/DL (ref 5–40)
WBC NRBC COR # BLD: 8.2 10*3/MM3 (ref 3.4–10.8)

## 2023-06-14 PROCEDURE — 85027 COMPLETE CBC AUTOMATED: CPT | Performed by: NURSE PRACTITIONER

## 2023-06-14 PROCEDURE — 80061 LIPID PANEL: CPT | Performed by: NURSE PRACTITIONER

## 2023-06-14 PROCEDURE — 93306 TTE W/DOPPLER COMPLETE: CPT | Performed by: INTERNAL MEDICINE

## 2023-06-14 PROCEDURE — 93880 EXTRACRANIAL BILAT STUDY: CPT | Performed by: RADIOLOGY

## 2023-06-14 PROCEDURE — 80053 COMPREHEN METABOLIC PANEL: CPT | Performed by: NURSE PRACTITIONER

## 2023-06-14 PROCEDURE — 93880 EXTRACRANIAL BILAT STUDY: CPT

## 2023-06-14 PROCEDURE — 84443 ASSAY THYROID STIM HORMONE: CPT | Performed by: NURSE PRACTITIONER

## 2023-06-14 PROCEDURE — 93306 TTE W/DOPPLER COMPLETE: CPT

## 2023-09-25 ENCOUNTER — OFFICE VISIT (OUTPATIENT)
Dept: ORTHOPEDIC SURGERY | Facility: CLINIC | Age: 48
End: 2023-09-25
Payer: COMMERCIAL

## 2023-09-25 ENCOUNTER — HOSPITAL ENCOUNTER (OUTPATIENT)
Dept: GENERAL RADIOLOGY | Facility: HOSPITAL | Age: 48
Discharge: HOME | End: 2023-09-25
Admitting: PHYSICIAN ASSISTANT
Payer: COMMERCIAL

## 2023-09-25 VITALS
BODY MASS INDEX: 32.64 KG/M2 | DIASTOLIC BLOOD PRESSURE: 66 MMHG | SYSTOLIC BLOOD PRESSURE: 123 MMHG | HEIGHT: 70 IN | WEIGHT: 228 LBS | HEART RATE: 61 BPM

## 2023-09-25 DIAGNOSIS — M25.562 LEFT KNEE PAIN, UNSPECIFIED CHRONICITY: Primary | ICD-10-CM

## 2023-09-25 DIAGNOSIS — M25.562 LEFT KNEE PAIN, UNSPECIFIED CHRONICITY: ICD-10-CM

## 2023-09-25 PROCEDURE — 73562 X-RAY EXAM OF KNEE 3: CPT

## 2023-09-25 PROCEDURE — 73562 X-RAY EXAM OF KNEE 3: CPT | Performed by: RADIOLOGY

## 2023-09-25 RX ORDER — IBUPROFEN 800 MG/1
800 TABLET ORAL
COMMUNITY
Start: 2023-08-18

## 2023-09-25 RX ORDER — ASPIRIN 81 MG/1
81 TABLET, COATED ORAL DAILY
COMMUNITY

## 2023-10-10 RX ORDER — LIDOCAINE HYDROCHLORIDE 10 MG/ML
5 INJECTION, SOLUTION EPIDURAL; INFILTRATION; INTRACAUDAL; PERINEURAL
Status: COMPLETED | OUTPATIENT
Start: 2023-10-10 | End: 2023-10-10

## 2023-10-10 RX ORDER — METHYLPREDNISOLONE ACETATE 80 MG/ML
80 INJECTION, SUSPENSION INTRA-ARTICULAR; INTRALESIONAL; INTRAMUSCULAR; SOFT TISSUE
Status: COMPLETED | OUTPATIENT
Start: 2023-10-10 | End: 2023-10-10

## 2023-10-10 RX ADMIN — METHYLPREDNISOLONE ACETATE 80 MG: 80 INJECTION, SUSPENSION INTRA-ARTICULAR; INTRALESIONAL; INTRAMUSCULAR; SOFT TISSUE at 10:18

## 2023-10-10 RX ADMIN — LIDOCAINE HYDROCHLORIDE 5 ML: 10 INJECTION, SOLUTION EPIDURAL; INFILTRATION; INTRACAUDAL; PERINEURAL at 10:18

## 2023-10-10 NOTE — PROGRESS NOTES
[facility] [place] [facility] New Patient Visit          Patient: Jorge A Gordillo  YOB: 1975  Date of Encounter: 09/25/2023  PCP: Nanette Liz APRN      Subjective     Chief Complaint   Patient presents with    Left Knee - Initial Evaluation, Pain           History of Present Illness:     Jorge A Gordillo is a 48 y.o. male presents today as result of left knee pain several incidences of falls which the patient was complaining pain to the medial lateral aspects of the knee with occasional posterior knee pain.  Patient had an arthroscopy in 2007.  Since that time he has had progressive pain and symptoms with difficulty upon repetitive motion and activity.  Patient reports painful prolonged sitting and standing.  Patient has attempted anti-inflammatory medication with no significant improvement.  Patient describes occasional catching sensation upon attempted full extension.  No other new complaints.  Denies any paresthesias.            Patient Active Problem List   Diagnosis    Vitamin D deficiency    Metabolic syndrome    Chronic fatigue    Smoking    Opioid use disorder, severe, in sustained remission    Dissection of thoracoabdominal aorta    PAF (paroxysmal atrial fibrillation)    Primary hypertension    Precordial pain     Past Medical History:   Diagnosis Date    Arthritis     Atrial fibrillation     Dissecting AAA (abdominal aortic aneurysm)     Elevated cholesterol     GERD (gastroesophageal reflux disease)     Hypertension     Myocardial infarct      Past Surgical History:   Procedure Laterality Date    CARDIAC CATHETERIZATION  04/26/2019    - Normal Coronaries. EF 55%    CARDIAC CATHETERIZATION  03/09/2022    Ectatic Coronaries. Low BP and HR    CARDIOVASCULAR STRESS TEST  03/26/2018    @ [facility]. Dr. Stringer- Adenosine- EF 42%. Inferior Infarct    CARDIOVASCULAR STRESS TEST  10/22/2021    Lexiscan- EF 46%. Reverse Redistribution Inferior wall    ECHO - CONVERTED  09/17/2021    @ . EF 57%.  Trace MR    ECHO - CONVERTED  2018    @ [facility]. - EF 60%. Trace MR    ECHO - CONVERTED  10/22/2021    TLS. EF 55%. LA- 3.9 Cm. Trace-Mild MR. RVSP- 14 mmHg    ECHO - CONVERTED  2023    TLS. EF 60%. Trace-Mild MR. RVSP- 30 mmHg    KNEE SURGERY      OTHER SURGICAL HISTORY  10/02/2021    CTA- Patent Thoracic stent.AAA distal to stent    OTHER SURGICAL HISTORY  2021    CTA- Dissection of thoracis aorta distal to (L) Subclavian artery    OTHER SURGICAL HISTORY  2018    Loop recorder placement    OTHER SURGICAL HISTORY  2021    @ . Thoracic endovascular stent    OTHER SURGICAL HISTORY  2021    Pulse O2- Borderline     Social History     Occupational History    Not on file   Tobacco Use    Smoking status: Every Day     Packs/day: .25     Types: Cigarettes    Smokeless tobacco: Never    Tobacco comments:     trying to quit, would like to try Chantix   Vaping Use    Vaping Use: Never used   Substance and Sexual Activity    Alcohol use: Not Currently    Drug use: Yes     Types: Marijuana     Comment: smokes marijuana daily    Sexual activity: Defer    Jorge A Gordillo  reports that he has been smoking cigarettes. He has been smoking an average of .25 packs per day. He has never used smokeless tobacco.. I have educated him on the risk of diseases from using tobacco products such as cancer, COPD, and heart disease.     I advised him to quit and he is not willing to quit.    I spent 3  minutes counseling the patient.          Social History     Social History Narrative    Not on file     Family History   Problem Relation Age of Onset    Heart disease Mother     Other Father          in accident at 42    Heart disease Sister     Other Brother         PPM    Heart disease Brother     Other Other         grandparents medical history unknown    No Known Problems Sister     No Known Problems Brother      Current Outpatient Medications   Medication Sig Dispense Refill    Arginine 1000 MG  "tablet Take 1 tablet by mouth 2 (Two) Times a Day. 180 each 3    buPROPion XL (WELLBUTRIN XL) 150 MG 24 hr tablet Take 1 tablet by mouth Daily.      hydrOXYzine (ATARAX) 25 MG tablet Take 1 tablet by mouth 3 (Three) Times a Day As Needed for Itching.      ibuprofen (ADVIL,MOTRIN) 800 MG tablet 1 tablet.      isosorbide dinitrate (ISORDIL) 20 MG tablet Take 1 tablet by mouth 3 (Three) Times a Day. 270 tablet 3    lisinopril (PRINIVIL,ZESTRIL) 10 MG tablet Take 1 tablet by mouth Daily. 90 tablet 3    metoprolol succinate XL (TOPROL-XL) 50 MG 24 hr tablet Take 1 tablet by mouth Daily. 90 tablet 3    NIFEdipine CC (ADALAT CC) 30 MG 24 hr tablet Take 1 tablet by mouth Daily. 90 tablet 3    pantoprazole (Protonix) 40 MG EC tablet Take 1 tablet by mouth Daily. 90 tablet 3    rosuvastatin (CRESTOR) 5 MG tablet Take 1 tablet by mouth Every Night.      Aspirin Low Dose 81 MG EC tablet Take 1 tablet by mouth Daily.       No current facility-administered medications for this visit.     No Known Allergies         Review of Systems   Constitutional: Negative.   HENT: Negative.     Eyes: Negative.    Cardiovascular:  Positive for chest pain, irregular heartbeat and leg swelling.   Respiratory: Negative.     Endocrine: Negative.    Hematologic/Lymphatic: Negative.    Skin: Negative.    Musculoskeletal:  Positive for back pain, joint pain, joint swelling and neck pain.        Pertinent positives listed in HPI   Gastrointestinal: Negative.    Genitourinary:  Positive for urgency.   Neurological:  Positive for focal weakness and numbness.   Psychiatric/Behavioral:  Positive for depression. The patient is nervous/anxious.    Allergic/Immunologic: Negative.          Objective      Vitals:    09/25/23 0947   BP: 123/66   Pulse: 61   Weight: 103 kg (228 lb)   Height: 177.8 cm (70\")             Physical Exam  Vitals and nursing note reviewed.   Constitutional:       General: He is not in acute distress.     Appearance: Normal appearance. " He is not ill-appearing.   HENT:      Head: Normocephalic and atraumatic.      Right Ear: External ear normal.      Left Ear: External ear normal.      Nose: Nose normal.      Mouth/Throat:      Mouth: Mucous membranes are moist.      Pharynx: Oropharynx is clear.   Eyes:      Extraocular Movements: Extraocular movements intact.      Conjunctiva/sclera: Conjunctivae normal.      Pupils: Pupils are equal, round, and reactive to light.   Cardiovascular:      Rate and Rhythm: Normal rate.      Pulses: Normal pulses.   Pulmonary:      Effort: Pulmonary effort is normal.   Abdominal:      General: There is no distension.   Musculoskeletal:      Cervical back: Normal range of motion. No rigidity.      Comments: Examination today patient's left knee reveals medial and lateral joint line tenderness palpation.  Full range of motion with no instability upon varus/valgus stress.  Lachman and drawer testing negative.  Dustin's and Apley's grind testing negative.  Neurovascular status grossly intact left lower extremity   Skin:     General: Skin is warm and dry.      Capillary Refill: Capillary refill takes less than 2 seconds.   Neurological:      General: No focal deficit present.      Mental Status: He is alert and oriented to person, place, and time.   Psychiatric:         Mood and Affect: Mood normal.         Behavior: Behavior normal.                 Radiology:      XR Knee 3 View Left    Result Date: 9/25/2023    No acute findings in the left knee.   This report was finalized on 9/25/2023 9:42 AM by Dr. Adolfo Bauer MD.      CT Angiogram Chest    Result Date: 9/13/2023  Vascular findings are unchanged with unchanged extent and true lumen diameter of the grade B dissection from the aortic isthmus to the left common iliac artery bifurcation, unchanged size of the abdominal aortic aneurysm, unchanged in position. The 8 mm right upper lobe pulmonary nodule is unchanged in size for 2 years. Annual CT is recommended.  Multicystic lesion in the right scrotum, unchanged in size since September 17, 2021. Given the chronicity, this is likely a hydrocele.  Recommend follow-up with scrotal ultrasound for better characterization. CRITICAL RESULT:   No COMMUNICATION: Per this report Preliminary report signed by Erasto Coles DO on 9/13/2023 8:55 AM By electronically signing this report, I, the attending physician, attest that I have personally reviewed the images/data for the above examination(s) and agree with the final edited report. Drafted by Erasto Coles DO on 9/13/2023 7:57 AM Final report signed by Marcel William MD on 9/13/2023 10:39 AM    CT Abdomen Pelvis With Contrast    Result Date: 9/13/2023  Vascular findings are unchanged with unchanged extent and true lumen diameter of the grade B dissection from the aortic isthmus to the left common iliac artery bifurcation, unchanged size of the abdominal aortic aneurysm, unchanged in position. The 8 mm right upper lobe pulmonary nodule is unchanged in size for 2 years. Annual CT is recommended. Multicystic lesion in the right scrotum, unchanged in size since September 17, 2021. Given the chronicity, this is likely a hydrocele.  Recommend follow-up with scrotal ultrasound for better characterization. CRITICAL RESULT:   No COMMUNICATION: Per this report Preliminary report signed by Erasto Coles DO on 9/13/2023 8:55 AM By electronically signing this report, I, the attending physician, attest that I have personally reviewed the images/data for the above examination(s) and agree with the final edited report. Drafted by Erasto Coles DO on 9/13/2023 7:57 AM Final report signed by Marcel William MD on 9/13/2023 10:39 AM    Duplex Aorta IVC Iliac Graft Complete CAR    Result Date: 9/12/2023  Abnormal study. Evidence of a dissecting abdominal aortic aneurysm is identified. Aneurysmal dilatation with dissection is noted in the left common iliac artery. COMMUNICATION: Per this  written report. Preliminary report signed by Alfonso Sanchez RVT on 9/12/2023 1:13 PM By electronically signing this report, I, the attending physician, attest that I have personally reviewed the images/data for the above examination(s) and agree with the final edited report. Drafted by Alfonso Sanchez RVT on 9/12/2023 1:04 PM Final report signed by Justin Hernandez MD, FACS, FSVS, RPVI on 9/12/2023 3:26 PM             Assessment/Plan        ICD-10-CM ICD-9-CM   1. Left knee pain, unspecified chronicity  M25.562 719.46       48-year-old male with notable several month history of progressive worsening left knee pain.  The patient suffered several falls as result of the knee giving out.  On examination the patient has joint line tenderness and radiographic evidence of mild degenerative change.  Patient has previous evidence of a knee arthroscopy and same knee.  Today as result of the patient's pain and symptoms the patient was provided with intra-articular steroid injection left knee 80 mg Depo-Medrol with lidocaine block injected into the intra-articular space of the left knee.  Patient tolerated this procedure well.  Patient was instructed to return back in 3 weeks for further evaluation of the efficacy of the conservative treatment.      Large Joint Arthrocentesis: L knee  Date/Time: 10/10/2023 10:18 AM  Consent given by: patient  Site marked: site marked  Timeout: Immediately prior to procedure a time out was called to verify the correct patient, procedure, equipment, support staff and site/side marked as required   Supporting Documentation  Indications: pain and diagnostic evaluation   Procedure Details  Location: knee - L knee  Needle size: 25 G  Approach: anterolateral  Medications administered: 80 mg methylPREDNISolone acetate 80 MG/ML; 5 mL lidocaine PF 1% 1 %  Patient tolerance: patient tolerated the procedure well with no immediate complications                      This document was signed by Eric MARTIN  SURESH Reinoso September 25, 2023    CC: Nanette Liz, TOBY      Dictated Utilizing Dragon Dictation:   Please note that portions of this note were completed with a voice recognition program.   Part of this note may be an electronic transcription/translation of spoken language to printed text using the Dragon Dictation System.

## 2023-10-23 ENCOUNTER — OFFICE VISIT (OUTPATIENT)
Dept: ORTHOPEDIC SURGERY | Facility: CLINIC | Age: 48
End: 2023-10-23
Payer: COMMERCIAL

## 2023-10-23 VITALS — WEIGHT: 228 LBS | BODY MASS INDEX: 32.64 KG/M2 | HEIGHT: 70 IN

## 2023-10-23 DIAGNOSIS — M25.562 LEFT KNEE PAIN, UNSPECIFIED CHRONICITY: Primary | ICD-10-CM

## 2023-10-23 PROCEDURE — 99213 OFFICE O/P EST LOW 20 MIN: CPT | Performed by: PHYSICIAN ASSISTANT

## 2023-10-23 NOTE — PROGRESS NOTES
[facility] [place] [facility] [place] Patient Visit          Patient: Jorge A Gordillo  YOB: 1975  Date of Encounter: 10/23/2023  PCP: Nanette Liz APRN      Subjective     Chief Complaint   Patient presents with    Left Knee - Follow-up, Pain           History of Present Illness:     Jorge A Gordillo is a 48 y.o. male presents today as result follow up evaluation left knee pain several incidences of falls in which the patient has ongoing pain to the medial lateral aspects of the knee with occasional posterior knee pain.  Patient had an arthroscopy in 2007.  Since that time he has had progressive pain and symptoms with difficulty upon repetitive motion and activity.  Patient reports painful prolonged sitting and standing.  Patient has attempted anti-inflammatory medication with no significant improvement.  Patient has undergone previous intra-articular steroid injection with minimal temporary alleviation for return of pain and symptoms.  Patient describes occasional catching sensation upon attempted full extension.  No other new complaints.  Denies any paresthesias.            Patient Active Problem List   Diagnosis    Vitamin D deficiency    Metabolic syndrome    Chronic fatigue    Smoking    Opioid use disorder, severe, in sustained remission    Dissection of thoracoabdominal aorta    PAF (paroxysmal atrial fibrillation)    Primary hypertension    Precordial pain     Past Medical History:   Diagnosis Date    Arthritis     Atrial fibrillation     Dissecting AAA (abdominal aortic aneurysm)     Elevated cholesterol     GERD (gastroesophageal reflux disease)     Hypertension     Myocardial infarct      Past Surgical History:   Procedure Laterality Date    CARDIAC CATHETERIZATION  04/26/2019    - Normal Coronaries. EF 55%    CARDIAC CATHETERIZATION  03/09/2022    Ectatic Coronaries. Low BP and HR    CARDIOVASCULAR STRESS TEST  03/26/2018    @ [facility]. Dr. Stringer- Adenosine- EF 42%. Inferior Infarct     CARDIOVASCULAR STRESS TEST  10/22/2021    Lexiscan- EF 46%. Reverse Redistribution Inferior wall    ECHO - CONVERTED  2021    @ . EF 57%. Trace MR    ECHO - CONVERTED  2018    @ [facility]. - EF 60%. Trace MR    ECHO - CONVERTED  10/22/2021    TLS. EF 55%. LA- 3.9 Cm. Trace-Mild MR. RVSP- 14 mmHg    ECHO - CONVERTED  2023    TLS. EF 60%. Trace-Mild MR. RVSP- 30 mmHg    KNEE SURGERY      OTHER SURGICAL HISTORY  10/02/2021    CTA- Patent Thoracic stent.AAA distal to stent    OTHER SURGICAL HISTORY  2021    CTA- Dissection of thoracis aorta distal to (L) Subclavian artery    OTHER SURGICAL HISTORY  2018    Loop recorder placement    OTHER SURGICAL HISTORY  2021    @ . Thoracic endovascular stent    OTHER SURGICAL HISTORY  2021    Pulse O2- Borderline     Social History     Occupational History    Not on file   Tobacco Use    Smoking status: Every Day     Packs/day: .25     Types: Cigarettes    Smokeless tobacco: Never    Tobacco comments:     trying to quit, would like to try Chantix   Vaping Use    Vaping Use: Never used   Substance and Sexual Activity    Alcohol use: Not Currently    Drug use: Yes     Types: Marijuana     Comment: smokes marijuana daily    Sexual activity: Defer    Jorge A Gordillo  reports that he has been smoking cigarettes. He has been smoking an average of .25 packs per day. He has never used smokeless tobacco.. I have educated him on the risk of diseases from using tobacco products such as cancer, COPD, and heart disease.               Social History     Social History Narrative    Not on file     Family History   Problem Relation Age of Onset    Heart disease Mother     Other Father          in accident at 42    Heart disease Sister     Other Brother         PPM    Heart disease Brother     Other Other         grandparents medical history unknown    No Known Problems Sister     No Known Problems Brother      Current Outpatient Medications  "  Medication Sig Dispense Refill    Arginine 1000 MG tablet Take 1 tablet by mouth 2 (Two) Times a Day. 180 each 3    Aspirin Low Dose 81 MG EC tablet Take 1 tablet by mouth Daily.      buPROPion XL (WELLBUTRIN XL) 150 MG 24 hr tablet Take 1 tablet by mouth Daily.      hydrOXYzine (ATARAX) 25 MG tablet Take 1 tablet by mouth 3 (Three) Times a Day As Needed for Itching.      ibuprofen (ADVIL,MOTRIN) 800 MG tablet 1 tablet.      isosorbide dinitrate (ISORDIL) 20 MG tablet Take 1 tablet by mouth 3 (Three) Times a Day. 270 tablet 3    lisinopril (PRINIVIL,ZESTRIL) 10 MG tablet Take 1 tablet by mouth Daily. 90 tablet 3    metoprolol succinate XL (TOPROL-XL) 50 MG 24 hr tablet Take 1 tablet by mouth Daily. 90 tablet 3    NIFEdipine CC (ADALAT CC) 30 MG 24 hr tablet Take 1 tablet by mouth Daily. 90 tablet 3    pantoprazole (Protonix) 40 MG EC tablet Take 1 tablet by mouth Daily. 90 tablet 3    rosuvastatin (CRESTOR) 5 MG tablet Take 1 tablet by mouth Every Night.       No current facility-administered medications for this visit.     No Known Allergies         Review of Systems   Constitutional: Negative.   HENT: Negative.     Eyes: Negative.    Cardiovascular:  Positive for chest pain, irregular heartbeat and leg swelling.   Respiratory: Negative.     Endocrine: Negative.    Hematologic/Lymphatic: Negative.    Skin: Negative.    Musculoskeletal:  Positive for back pain, joint pain, joint swelling and neck pain.        Pertinent positives listed in HPI   Gastrointestinal: Negative.    Genitourinary:  Positive for urgency.   Neurological:  Positive for focal weakness and numbness.   Psychiatric/Behavioral:  Positive for depression. The patient is nervous/anxious.    Allergic/Immunologic: Negative.          Objective      Vitals:    10/23/23 0905   Weight: 103 kg (228 lb)   Height: 177.8 cm (70\")             Physical Exam  Vitals and nursing note reviewed.   Constitutional:       General: He is not in acute distress.     " Appearance: Normal appearance. He is not ill-appearing.   HENT:      Head: Normocephalic and atraumatic.      Right Ear: External ear normal.      Left Ear: External ear normal.      Nose: Nose normal.      Mouth/Throat:      Mouth: Mucous membranes are moist.      Pharynx: Oropharynx is clear.   Eyes:      Extraocular Movements: Extraocular movements intact.      Conjunctiva/sclera: Conjunctivae normal.      Pupils: Pupils are equal, round, and reactive to light.   Cardiovascular:      Rate and Rhythm: Normal rate.      Pulses: Normal pulses.   Pulmonary:      Effort: Pulmonary effort is normal.   Abdominal:      General: There is no distension.   Musculoskeletal:      Cervical back: Normal range of motion. No rigidity.      Comments: Examination today patient's left knee reveals medial and lateral joint line tenderness palpation.  Full range of motion with no instability upon varus/valgus stress.  Lachman and drawer testing negative.  Dustin's and Apley's grind testing equivocal medially.  Neurovascular status grossly intact left lower extremity   Skin:     General: Skin is warm and dry.      Capillary Refill: Capillary refill takes less than 2 seconds.   Neurological:      General: No focal deficit present.      Mental Status: He is alert and oriented to person, place, and time.   Psychiatric:         Mood and Affect: Mood normal.         Behavior: Behavior normal.                 Radiology:      XR Knee 3 View Left    Result Date: 9/25/2023    No acute findings in the left knee.   This report was finalized on 9/25/2023 9:42 AM by Dr. Adolfo Bauer MD.               Assessment/Plan        ICD-10-CM ICD-9-CM   1. Left knee pain, unspecified chronicity  M25.562 719.46       48-year-old male with notable several month history of progressive worsening left knee pain with concern for meniscal pathology.  Patient has a prior history of a knee arthroscopy 2007 with chondrocalcinosis noted on radiographs and  continuation of pain symptoms with absence of alleviation with conservative injection, medication, therapy exercises, etc.  As result of the continuation of pain symptoms as a diagnostic approach the patient was provided with an order for MRI left knee.  Patient will return back upon completion of further evaluation.              This document was signed by Eric Reinoso PA-C October 23, 2023    CC: Nanette Liz APRN      Dictated Utilizing Dragon Dictation:   Please note that portions of this note were completed with a voice recognition program.   Part of this note may be an electronic transcription/translation of spoken language to printed text using the Dragon Dictation System.

## 2023-11-09 ENCOUNTER — HOSPITAL ENCOUNTER (OUTPATIENT)
Dept: MRI IMAGING | Facility: HOSPITAL | Age: 48
Discharge: HOME | End: 2023-11-09
Admitting: PHYSICIAN ASSISTANT
Payer: COMMERCIAL

## 2023-11-09 DIAGNOSIS — M25.562 LEFT KNEE PAIN, UNSPECIFIED CHRONICITY: ICD-10-CM

## 2023-11-09 PROCEDURE — 73721 MRI JNT OF LWR EXTRE W/O DYE: CPT

## 2023-11-15 ENCOUNTER — OFFICE VISIT (OUTPATIENT)
Dept: ORTHOPEDIC SURGERY | Facility: CLINIC | Age: 48
End: 2023-11-15
Payer: COMMERCIAL

## 2023-11-15 VITALS — HEIGHT: 70 IN | BODY MASS INDEX: 32.64 KG/M2 | WEIGHT: 228 LBS

## 2023-11-15 DIAGNOSIS — M25.562 LEFT KNEE PAIN, UNSPECIFIED CHRONICITY: Primary | ICD-10-CM

## 2023-11-15 DIAGNOSIS — M17.12 PRIMARY OSTEOARTHRITIS OF LEFT KNEE: ICD-10-CM

## 2023-11-15 PROCEDURE — 99213 OFFICE O/P EST LOW 20 MIN: CPT | Performed by: PHYSICIAN ASSISTANT

## 2023-11-15 RX ORDER — FAMOTIDINE 40 MG/1
40 TABLET, FILM COATED ORAL DAILY
COMMUNITY

## 2023-11-15 NOTE — PROGRESS NOTES
[facility] [place] [facility] Surgery Established Patient Visit          Patient: Jorge A Gordillo  YOB: 1975  Date of Encounter: 11/15/2023  PCP: Nanette Liz APRN      Subjective     Chief Complaint   Patient presents with    Left Knee - Follow-up, Pain           History of Present Illness:     Jorge A Gordillo is a 48 y.o. male presents today follow up continued knee pain and symptoms result. He presents for MRI follow up evaluation left knee pain. Once again he reports several incidences of falls in which the patient has ongoing pain to the medial lateral aspects of the knee with occasional posterior knee pain.  Patient had an arthroscopy in 2007.  Since that time he has had progressive pain and symptoms with difficulty upon repetitive motion and activity.  Patient reports painful prolonged sitting and standing.  Patient has attempted anti-inflammatory medication with no significant improvement.  Patient has undergone previous intra-articular steroid injection with minimal temporary alleviation for return of pain and symptoms.  Patient describes occasional catching sensation upon attempted full extension.  No other new complaints.  Denies any paresthesias.            Patient Active Problem List   Diagnosis    Vitamin D deficiency    Metabolic syndrome    Chronic fatigue    Smoking    Opioid use disorder, severe, in sustained remission    Dissection of thoracoabdominal aorta    PAF (paroxysmal atrial fibrillation)    Primary hypertension    Precordial pain     Past Medical History:   Diagnosis Date    Arthritis     Atrial fibrillation     Brain lesion     Dissecting AAA (abdominal aortic aneurysm)     Elevated cholesterol     GERD (gastroesophageal reflux disease)     Hypertension     Myocardial infarct      Past Surgical History:   Procedure Laterality Date    CARDIAC CATHETERIZATION  04/26/2019    - Normal Coronaries. EF 55%    CARDIAC CATHETERIZATION  03/09/2022    Ectatic Coronaries. Low BP and HR     CARDIOVASCULAR STRESS TEST  2018    @ [facility]. Dr. Stringer- Adenosine- EF 42%. Inferior Infarct    CARDIOVASCULAR STRESS TEST  10/22/2021    Lexiscan- EF 46%. Reverse Redistribution Inferior wall    ECHO - CONVERTED  2021    @ . EF 57%. Trace MR    ECHO - CONVERTED  2018    @ [facility]. - EF 60%. Trace MR    ECHO - CONVERTED  10/22/2021    TLS. EF 55%. LA- 3.9 Cm. Trace-Mild MR. RVSP- 14 mmHg    ECHO - CONVERTED  2023    TLS. EF 60%. Trace-Mild MR. RVSP- 30 mmHg    KNEE SURGERY      OTHER SURGICAL HISTORY  10/02/2021    CTA- Patent Thoracic stent.AAA distal to stent    OTHER SURGICAL HISTORY  2021    CTA- Dissection of thoracis aorta distal to (L) Subclavian artery    OTHER SURGICAL HISTORY  2018    Loop recorder placement    OTHER SURGICAL HISTORY  2021    @ . Thoracic endovascular stent    OTHER SURGICAL HISTORY  2021    Pulse O2- Borderline     Social History     Occupational History    Not on file   Tobacco Use    Smoking status: Every Day     Packs/day: .25     Types: Cigarettes    Smokeless tobacco: Never    Tobacco comments:     trying to quit, would like to try Chantix   Vaping Use    Vaping Use: Never used   Substance and Sexual Activity    Alcohol use: Not Currently    Drug use: Yes     Types: Marijuana     Comment: smokes marijuana daily    Sexual activity: Defer    Jorge A Gordillo  reports that he has been smoking cigarettes. He has been smoking an average of .25 packs per day. He has never used smokeless tobacco.. I have educated him on the risk of diseases from using tobacco products such as cancer, COPD, and heart disease.               Social History     Social History Narrative    Not on file     Family History   Problem Relation Age of Onset    Heart disease Mother     Other Father          in accident at 42    Heart disease Sister     Other Brother         PPM    Heart disease Brother     Other Other         grandparents medical history  unknown    No Known Problems Sister     No Known Problems Brother      Current Outpatient Medications   Medication Sig Dispense Refill    Arginine 1000 MG tablet Take 1 tablet by mouth 2 (Two) Times a Day. 180 each 3    Aspirin Low Dose 81 MG EC tablet Take 1 tablet by mouth Daily.      buPROPion XL (WELLBUTRIN XL) 150 MG 24 hr tablet Take 1 tablet by mouth Daily.      famotidine (PEPCID) 40 MG tablet Take 1 tablet by mouth Daily.      hydrOXYzine (ATARAX) 25 MG tablet Take 1 tablet by mouth 3 (Three) Times a Day As Needed for Itching.      ibuprofen (ADVIL,MOTRIN) 800 MG tablet 1 tablet.      isosorbide dinitrate (ISORDIL) 20 MG tablet Take 1 tablet by mouth 3 (Three) Times a Day. 270 tablet 3    lisinopril (PRINIVIL,ZESTRIL) 10 MG tablet Take 1 tablet by mouth Daily. 90 tablet 3    metoprolol succinate XL (TOPROL-XL) 50 MG 24 hr tablet Take 1 tablet by mouth Daily. 90 tablet 3    NIFEdipine CC (ADALAT CC) 30 MG 24 hr tablet Take 1 tablet by mouth Daily. 90 tablet 3    rosuvastatin (CRESTOR) 5 MG tablet Take 1 tablet by mouth Every Night.      pantoprazole (Protonix) 40 MG EC tablet Take 1 tablet by mouth Daily. (Patient not taking: Reported on 11/15/2023) 90 tablet 3     No current facility-administered medications for this visit.     No Known Allergies         Review of Systems   Constitutional: Negative.   HENT: Negative.     Eyes: Negative.    Cardiovascular:  Positive for chest pain, irregular heartbeat and leg swelling.   Respiratory: Negative.     Endocrine: Negative.    Hematologic/Lymphatic: Negative.    Skin: Negative.    Musculoskeletal:  Positive for back pain, joint pain, joint swelling and neck pain.        Pertinent positives listed in HPI   Gastrointestinal: Negative.    Genitourinary:  Positive for urgency.   Neurological:  Positive for focal weakness and numbness.   Psychiatric/Behavioral:  Positive for depression. The patient is nervous/anxious.    Allergic/Immunologic: Negative.   "        Objective      Vitals:    11/15/23 0838   Weight: 103 kg (228 lb)   Height: 177.8 cm (70\")             Physical Exam  Vitals and nursing note reviewed.   Constitutional:       General: He is not in acute distress.     Appearance: Normal appearance. He is not ill-appearing.   HENT:      Head: Normocephalic and atraumatic.      Right Ear: External ear normal.      Left Ear: External ear normal.      Nose: Nose normal.      Mouth/Throat:      Mouth: Mucous membranes are moist.      Pharynx: Oropharynx is clear.   Eyes:      Extraocular Movements: Extraocular movements intact.      Conjunctiva/sclera: Conjunctivae normal.      Pupils: Pupils are equal, round, and reactive to light.   Cardiovascular:      Rate and Rhythm: Normal rate.      Pulses: Normal pulses.   Pulmonary:      Effort: Pulmonary effort is normal.   Abdominal:      General: There is no distension.   Musculoskeletal:      Cervical back: Normal range of motion. No rigidity.      Comments: Examination today patient's left knee reveals medial and lateral joint line tenderness palpation.  Full range of motion with no instability upon varus/valgus stress.  Lachman and drawer testing negative.  Dustin's and Apley's grind testing equivocal medially.  Neurovascular status grossly intact left lower extremity   Skin:     General: Skin is warm and dry.      Capillary Refill: Capillary refill takes less than 2 seconds.   Neurological:      General: No focal deficit present.      Mental Status: He is alert and oriented to person, place, and time.   Psychiatric:         Mood and Affect: Mood normal.         Behavior: Behavior normal.                 Radiology:      MRI Knee Left Without Contrast    Result Date: 11/9/2023  1.  Inferior aspect of patella subchondral bony contusion and patellar chondral defect. 2.  The lateral meniscus appears probably chronically torn and macerated. 3.  Medial meniscal degenerative signal and probably radial edge fraying. 4.  " Posterior popliteal cyst containing possible ossified body measuring about 9 mm. 5.  Lateral and medial compartment joint space narrowing.   This report was finalized on 11/9/2023 4:04 PM by Dr. Curly Odell MD.               Assessment/Plan        ICD-10-CM ICD-9-CM   1. Left knee pain, unspecified chronicity  M25.562 719.46   2. Primary osteoarthritis of left knee  M17.12 715.16         48-year-old male with notable several month history of progressive worsening left knee pain with MRI evidence of patellofemoral degenerative change and chondral defect with medial and lateral chronically torn macerated menisci.  Patient has notable popliteal cysts containing ossified body measuring 9 x 5 mm.  With the continuation of pain symptoms as well as absence of alleviation with conservative treatment options we discussed possibility of surgical intervention versus conservative treatment options.  Patient would like to exhaust all conservative options before discussing surgical intervention.  We discussed the amount of arthritis patient has in reference to the meniscal pathology and it is felt that the majority of his pain seems to be more degenerative in nature.  Today, the patient was given instructions to which we wanted to proceed with an injection and he wanted to wait approximately 1 to 2 weeks before proceeding with this secondary to personal and family complications.  Patient will return back in 1 week which we discussed possibility of repeat intra-articular steroid injection.           This document was signed by Eric Reinoso PA-C November 15, 2023    CC: Nanette Liz APRN      Dictated Utilizing Dragon Dictation:   Please note that portions of this note were completed with a voice recognition program.   Part of this note may be an electronic transcription/translation of spoken language to printed text using the Dragon Dictation System.

## 2023-12-05 ENCOUNTER — OFFICE VISIT (OUTPATIENT)
Dept: CARDIOLOGY | Facility: CLINIC | Age: 48
End: 2023-12-05
Payer: COMMERCIAL

## 2023-12-05 VITALS
WEIGHT: 233 LBS | SYSTOLIC BLOOD PRESSURE: 108 MMHG | HEIGHT: 70 IN | HEART RATE: 59 BPM | DIASTOLIC BLOOD PRESSURE: 58 MMHG | BODY MASS INDEX: 33.36 KG/M2

## 2023-12-05 DIAGNOSIS — R00.1 BRADYCARDIA, SINUS: ICD-10-CM

## 2023-12-05 DIAGNOSIS — I10 PRIMARY HYPERTENSION: Primary | ICD-10-CM

## 2023-12-05 DIAGNOSIS — F17.200 SMOKING: ICD-10-CM

## 2023-12-05 DIAGNOSIS — I71.03 DISSECTION OF THORACOABDOMINAL AORTA: ICD-10-CM

## 2023-12-05 DIAGNOSIS — E78.00 HYPERCHOLESTEROLEMIA: ICD-10-CM

## 2023-12-05 DIAGNOSIS — R42 DIZZINESS: ICD-10-CM

## 2023-12-05 PROCEDURE — 1159F MED LIST DOCD IN RCRD: CPT | Performed by: NURSE PRACTITIONER

## 2023-12-05 PROCEDURE — 3074F SYST BP LT 130 MM HG: CPT | Performed by: NURSE PRACTITIONER

## 2023-12-05 PROCEDURE — 1160F RVW MEDS BY RX/DR IN RCRD: CPT | Performed by: NURSE PRACTITIONER

## 2023-12-05 PROCEDURE — 3078F DIAST BP <80 MM HG: CPT | Performed by: NURSE PRACTITIONER

## 2023-12-05 RX ORDER — METOPROLOL SUCCINATE 25 MG/1
25 TABLET, EXTENDED RELEASE ORAL DAILY
Qty: 30 TABLET | Refills: 6 | Status: SHIPPED | OUTPATIENT
Start: 2023-12-05

## 2023-12-05 RX ORDER — METOPROLOL SUCCINATE 25 MG/1
25 TABLET, EXTENDED RELEASE ORAL DAILY
Start: 2023-12-05 | End: 2023-12-05 | Stop reason: SDUPTHER

## 2023-12-05 RX ORDER — VARENICLINE TARTRATE 1 MG/1
1 TABLET, FILM COATED ORAL 2 TIMES DAILY
Qty: 56 TABLET | Refills: 4 | Status: SHIPPED | OUTPATIENT
Start: 2024-01-02 | End: 2024-05-21

## 2023-12-05 RX ORDER — ROSUVASTATIN CALCIUM 5 MG/1
5 TABLET, COATED ORAL NIGHTLY
Qty: 30 TABLET | Refills: 6 | Status: SHIPPED | OUTPATIENT
Start: 2023-12-05

## 2023-12-05 RX ORDER — VARENICLINE TARTRATE 0.5 (11)-1
KIT ORAL
Qty: 1 EACH | Refills: 0 | Status: SHIPPED | OUTPATIENT
Start: 2023-12-05 | End: 2024-01-02

## 2023-12-05 RX ORDER — NIFEDIPINE 30 MG/1
30 TABLET, FILM COATED, EXTENDED RELEASE ORAL DAILY
Qty: 30 TABLET | Refills: 6 | Status: SHIPPED | OUTPATIENT
Start: 2023-12-05

## 2023-12-05 RX ORDER — ASPIRIN 81 MG/1
81 TABLET, COATED ORAL DAILY
Qty: 30 TABLET | Refills: 6 | Status: SHIPPED | OUTPATIENT
Start: 2023-12-05

## 2023-12-05 NOTE — PROGRESS NOTES
Chief Complaint   Patient presents with    Follow-up     Cardiac management    Atrial Fibrillation     Reports to having fluttering , racing and SOA    Dizziness     Has dizziness with standing and fast movement    LABS      Had labs September 2023 , results on chart on care everywhere    Med Refill     Needs refills on Pepcid, Imdur, Metoprolol, Nifedipine , Crestor ,Protonix and Aspirin 3 0 day supply to [facility]        Subjective       Jorge A Gordillo is a 48 y.o. male initially seen October 2021 for chronic consultation.  He has HTN and history of syncopal episodes.  He has history of dissection thoracic aorta with endovascular stenting in [place].  He had a brief episode of atrial fibrillation with spontaneous conversion to sinus.  He signed out AMA.  Due to chest pain he had repeat CT scan showing stable aorta, again signed out AMA.  On 10/22/2020 Lexiscan stress test showed diaphragmatic attenuation but could not rule out old MI.  Echocardiogram showed LVEF around 55%.  Previous [facility] loop recorder reached end-of-life, removed.  On 3/9/2022 cardiac catheterization showed ectatic left coronary system but no significant CAD.  EF was around 60%.    In September 2023 seen by Dr. Salazar at .  CTA and duplex showed stable appearing dissection with no increased in size, TEVAR in good position and no evidence of malperfusion.    Today returns the office for follow-up visit.  His main concerns headache and dizziness especially with position change.  According to wife blood pressure drops 20 points when standing.  He admits to falling easily.  Because of the symptoms patient reports having recent CT scan of brain showing lesion with plans for repeat.  He also request referral to vascular and [facility].  He admits he has signed out AMA when seen by vascular at .  He request Chantix for smoking cessation as he has taken in the past and found beneficial.  He wants to stop smoking cigarettes but admits he will  not stop smoking marijuana as it significantly helps his anxiety.    Cardiac History:    Past Surgical History:   Procedure Laterality Date    CARDIAC CATHETERIZATION  04/26/2019    - Normal Coronaries. EF 55%    CARDIAC CATHETERIZATION  03/09/2022    Ectatic Coronaries. Low BP and HR    CARDIOVASCULAR STRESS TEST  03/26/2018    @ [facility]. Dr. Stringer- Adenosine- EF 42%. Inferior Infarct    CARDIOVASCULAR STRESS TEST  10/22/2021    Lexiscan- EF 46%. Reverse Redistribution Inferior wall    ECHO - CONVERTED  09/17/2021    @ . EF 57%. Trace MR    ECHO - CONVERTED  03/27/2018    @ [facility]. - EF 60%. Trace MR    ECHO - CONVERTED  10/22/2021    TLS. EF 55%. LA- 3.9 Cm. Trace-Mild MR. RVSP- 14 mmHg    ECHO - CONVERTED  06/14/2023    TLS. EF 60%. Trace-Mild MR. RVSP- 30 mmHg    KNEE SURGERY      OTHER SURGICAL HISTORY  10/02/2021    CTA- Patent Thoracic stent.AAA distal to stent    OTHER SURGICAL HISTORY  09/17/2021    CTA- Dissection of thoracis aorta distal to (L) Subclavian artery    OTHER SURGICAL HISTORY  06/04/2018    Loop recorder placement    OTHER SURGICAL HISTORY  09/22/2021    @ . Thoracic endovascular stent    OTHER SURGICAL HISTORY  11/08/2021    Pulse O2- Borderline       Current Outpatient Medications   Medication Sig Dispense Refill    Arginine 1000 MG tablet Take 1 tablet by mouth 2 (Two) Times a Day. 180 each 3    Aspirin Low Dose 81 MG EC tablet Take 1 tablet by mouth Daily. 30 tablet 6    buPROPion XL (WELLBUTRIN XL) 150 MG 24 hr tablet Take 1 tablet by mouth Daily.      famotidine (PEPCID) 40 MG tablet Take 1 tablet by mouth Daily.      hydrOXYzine (ATARAX) 25 MG tablet Take 1 tablet by mouth 3 (Three) Times a Day As Needed for Itching.      ibuprofen (ADVIL,MOTRIN) 800 MG tablet 1 tablet.      isosorbide dinitrate (ISORDIL) 20 MG tablet Take 1 tablet by mouth 3 (Three) Times a Day. 270 tablet 3    lisinopril (PRINIVIL,ZESTRIL) 10 MG tablet Take 1 tablet by mouth Daily. (Patient taking  differently: Take 0.5 tablets by mouth Daily.) 90 tablet 3    metoprolol succinate XL (TOPROL-XL) 25 MG 24 hr tablet Take 1 tablet by mouth Daily. 30 tablet 6    NIFEdipine CC (ADALAT CC) 30 MG 24 hr tablet Take 1 tablet by mouth Daily. 30 tablet 6    pantoprazole (Protonix) 40 MG EC tablet Take 1 tablet by mouth Daily. 90 tablet 3    rosuvastatin (CRESTOR) 5 MG tablet Take 1 tablet by mouth Every Night. 30 tablet 6    [START ON 2024] varenicline (CHANTIX) 1 MG tablet Take 1 tablet by mouth 2 (Two) Times a Day for 140 days. 56 tablet 4    Varenicline Tartrate, Starter, 0.5 MG X 11 & 1 MG X 42 tablet therapy pack Take 0.5 mg by mouth Daily for 3 days, THEN 0.5 mg 2 (Two) Times a Day for 4 days, THEN 1 mg 2 (Two) Times a Day for 21 days. Take 0.5 mg po daily x 3 days, then 0.5 mg po bid x 4 days, then 1 mg po bid 1 each 0     No current facility-administered medications for this visit.       Patient has no known allergies.    Past Medical History:   Diagnosis Date    Arthritis     Atrial fibrillation     Brain lesion     Dissecting AAA (abdominal aortic aneurysm)     Elevated cholesterol     GERD (gastroesophageal reflux disease)     Hypertension     Myocardial infarct        Social History     Socioeconomic History    Marital status: Single   Tobacco Use    Smoking status: Every Day     Packs/day: .25     Types: Cigarettes    Smokeless tobacco: Never    Tobacco comments:     trying to quit, would like to try Chantix   Vaping Use    Vaping Use: Never used   Substance and Sexual Activity    Alcohol use: Not Currently    Drug use: Yes     Types: Marijuana     Comment: smokes marijuana daily    Sexual activity: Defer       Family History   Problem Relation Age of Onset    Heart disease Mother     Other Father          in accident at 42    Heart disease Sister     Other Brother         PPM    Heart disease Brother     Other Other         grandparents medical history unknown    No Known Problems Sister     No Known  "Problems Brother        Review of Systems   Constitutional: Positive for malaise/fatigue. Negative for diaphoresis and fever.   HENT:  Negative for nosebleeds.    Eyes:  Positive for visual disturbance.   Cardiovascular:  Positive for leg swelling (same). Negative for chest pain and near-syncope.   Respiratory:  Positive for cough, shortness of breath and sleep disturbances due to breathing.    Hematologic/Lymphatic: Negative for bleeding problem.   Musculoskeletal:  Positive for falls.   Gastrointestinal:  Negative for change in bowel habit.   Genitourinary:  Negative for hematuria.   Neurological:  Positive for dizziness, headaches, light-headedness and loss of balance.   Psychiatric/Behavioral:  Negative for altered mental status and suicidal ideas. The patient is nervous/anxious.         BP Readings from Last 5 Encounters:   12/05/23 108/58   09/25/23 123/66   05/18/23 130/66   07/20/22 150/70   04/13/22 150/80       Wt Readings from Last 5 Encounters:   12/05/23 106 kg (233 lb)   11/15/23 103 kg (228 lb)   10/23/23 103 kg (228 lb)   09/25/23 103 kg (228 lb)   05/18/23 104 kg (228 lb 6.4 oz)       Objective     /58 (BP Location: Left arm, Patient Position: Sitting)   Pulse 59   Ht 177.8 cm (70\")   Wt 106 kg (233 lb)   BMI 33.43 kg/m²     Vitals and nursing note reviewed.   Constitutional:       Appearance: Healthy appearance. Not in distress.   Eyes:      Conjunctiva/sclera: Conjunctivae normal.      Pupils: Pupils are equal, round, and reactive to light.   HENT:      Head: Normocephalic.   Neck:      Vascular: No carotid bruit.   Pulmonary:      Effort: Pulmonary effort is normal.      Breath sounds: Decreased breath sounds present. Wheezing present.   Cardiovascular:      PMI at left midclavicular line. Normal rate. Regular rhythm.   Edema:     Ankle: bilateral trace edema of the ankle.     Feet: bilateral trace edema of the feet.  Abdominal:      General: Bowel sounds are normal.      Palpations: " Abdomen is soft.   Musculoskeletal: Normal range of motion.      Cervical back: Normal range of motion and neck supple. Skin:     General: Skin is warm and dry.   Neurological:      Mental Status: Alert, oriented to person, place, and time and oriented to person, place and time.            ECG 12 Lead    Date/Time: 12/6/2023 8:15 AM  Performed by: Ana Rosa Mayorga APRN    Authorized by: Ana Rosa Mayorga APRN  Comparison: compared with previous ECG from 5/18/2023  Similar to previous ECG  Comparison to previous ECG: Previous EKG sinus  Rhythm: sinus bradycardia  BPM: 59               Assessment & Plan   Diagnoses and all orders for this visit:    1. Primary hypertension (Primary)  -     Aspirin Low Dose 81 MG EC tablet; Take 1 tablet by mouth Daily.  Dispense: 30 tablet; Refill: 6  -     NIFEdipine CC (ADALAT CC) 30 MG 24 hr tablet; Take 1 tablet by mouth Daily.  Dispense: 30 tablet; Refill: 6    2. Hypercholesterolemia  -     metoprolol succinate XL (TOPROL-XL) 25 MG 24 hr tablet; Take 1 tablet by mouth Daily.  Dispense: 30 tablet; Refill: 6  -     rosuvastatin (CRESTOR) 5 MG tablet; Take 1 tablet by mouth Every Night.  Dispense: 30 tablet; Refill: 6    3. Smoking  -     Varenicline Tartrate, Starter, 0.5 MG X 11 & 1 MG X 42 tablet therapy pack; Take 0.5 mg by mouth Daily for 3 days, THEN 0.5 mg 2 (Two) Times a Day for 4 days, THEN 1 mg 2 (Two) Times a Day for 21 days. Take 0.5 mg po daily x 3 days, then 0.5 mg po bid x 4 days, then 1 mg po bid  Dispense: 1 each; Refill: 0  -     varenicline (CHANTIX) 1 MG tablet; Take 1 tablet by mouth 2 (Two) Times a Day for 140 days.  Dispense: 56 tablet; Refill: 4    4. Dizziness  -     metoprolol succinate XL (TOPROL-XL) 25 MG 24 hr tablet; Take 1 tablet by mouth Daily.  Dispense: 30 tablet; Refill: 6    5. Dissection of thoracoabdominal aorta  -     Aspirin Low Dose 81 MG EC tablet; Take 1 tablet by mouth Daily.  Dispense: 30 tablet; Refill: 6  -     Ambulatory Referral to  Vascular Surgery    Other orders  -     Discontinue: metoprolol succinate XL (TOPROL-XL) 25 MG 24 hr tablet; Take 1 tablet by mouth Daily.  -     ECG 12 Lead      Hypertension  -BP low normal  -Reports 20 point drop in BP with standing  -Heart rate regular, rate in the 50s  -Continue lisinopril at lower dose being 5 mg daily, nifedipine 30 mg daily  -Decrease metoprolol to 25 mg daily  -Importance of good hydration discussed    Hypercholesterolemia  -Labs through your office  -Continue Crestor 5 mg daily    Smoking  -Cessation strongly encouraged  -Patient request Chantix prescription, admits to finding beneficial in the past    Dizziness/headaches  -admits to recent CT scan of brain with abnormal results, plan to have repeated.  -Monitor BP and for orthostatic dizziness  -As noted above dose of metoprolol decreased    History of thoracic aortic dissection with TEVAR  -September seen Dr. Salazar at  and CT scan reported as stable  -Patient request referral to [facility] vascular to establish care  -Continue daily aspirin  -Patient plans to work on smoking cessation    6-month follow-up visit scheduled.

## 2024-01-25 ENCOUNTER — TELEPHONE (OUTPATIENT)
Dept: NEUROLOGY | Facility: CLINIC | Age: 49
End: 2024-01-25
Payer: COMMERCIAL

## 2024-01-25 NOTE — TELEPHONE ENCOUNTER
Spoke to Gertrude to inform per Dr Hartmann that he would like him to keep the 04/02/24 appointment.  The MRI report was normal.  She expressed understanding and appreciation.

## 2024-01-25 NOTE — TELEPHONE ENCOUNTER
Caller: CARLOS    Relationship to patient: [facility]    Best call back number: [phone]    Chief complaint: SOONER APPT    Type of visit: NEW    Requested date: ASAP    If rescheduling, when is the original appointment: 4-2-24     Additional notes:   REFERRING PROVIDER CALLING TO SEE IF PT CAN BE SEEN SOONER.

## 2024-02-01 ENCOUNTER — OFFICE VISIT (OUTPATIENT)
Dept: NEUROLOGY | Facility: CLINIC | Age: 49
End: 2024-02-01
Payer: COMMERCIAL

## 2024-02-01 VITALS
HEIGHT: 70 IN | DIASTOLIC BLOOD PRESSURE: 80 MMHG | WEIGHT: 233 LBS | SYSTOLIC BLOOD PRESSURE: 142 MMHG | BODY MASS INDEX: 33.36 KG/M2 | HEART RATE: 69 BPM | OXYGEN SATURATION: 96 %

## 2024-02-01 DIAGNOSIS — R55 SYNCOPE AND COLLAPSE: Primary | ICD-10-CM

## 2024-02-01 PROCEDURE — 3077F SYST BP >= 140 MM HG: CPT | Performed by: PSYCHIATRY & NEUROLOGY

## 2024-02-01 PROCEDURE — 3079F DIAST BP 80-89 MM HG: CPT | Performed by: PSYCHIATRY & NEUROLOGY

## 2024-02-01 PROCEDURE — 99244 OFF/OP CNSLTJ NEW/EST MOD 40: CPT | Performed by: PSYCHIATRY & NEUROLOGY

## 2024-02-01 PROCEDURE — 1159F MED LIST DOCD IN RCRD: CPT | Performed by: PSYCHIATRY & NEUROLOGY

## 2024-02-01 PROCEDURE — 1160F RVW MEDS BY RX/DR IN RCRD: CPT | Performed by: PSYCHIATRY & NEUROLOGY

## 2024-02-01 RX ORDER — DIVALPROEX SODIUM 500 MG/1
500 TABLET, EXTENDED RELEASE ORAL DAILY
Qty: 30 TABLET | Refills: 5 | Status: SHIPPED | OUTPATIENT
Start: 2024-02-01 | End: 2025-01-31

## 2024-02-01 RX ORDER — ONDANSETRON 4 MG/1
1 TABLET, FILM COATED ORAL 2 TIMES DAILY PRN
COMMUNITY

## 2024-02-01 RX ORDER — METOCLOPRAMIDE HYDROCHLORIDE 10 MG/1
TABLET, ORALLY DISINTEGRATING ORAL
COMMUNITY
Start: 2024-01-15

## 2024-02-01 RX ORDER — INDOMETHACIN 25 MG/1
CAPSULE ORAL
COMMUNITY
End: 2024-02-01

## 2024-02-01 RX ORDER — METHOCARBAMOL 500 MG/1
1 TABLET, FILM COATED ORAL 4 TIMES DAILY
COMMUNITY
End: 2024-02-01

## 2024-02-01 NOTE — PROGRESS NOTES
Subjective   Patient ID: Jorge A Gordillo is a 49 y.o. male     Chief Complaint   Patient presents with    Abnormal MRI      History of Present Illness    49 y.o. male referred by Nanette LUIS for abnormal MRI Brain.     Gait is unsteady and feels dizzy.      Aortic dissection  s/p intravascular stenting.    Frequency is several times a day.      Occurs in any position.      Sudden onset of room moving, surge of pressure starts in head and moves down body in 4 - 5 seconds.  AMS, cannot speak.  Occurs twice a day.      HA frequency is daily.  Sharp stabbing.  Lasts for seconds.  Occurs every hour.      MRI Brain, my review of films 24, mild       Started on Wellbutrin 2021.       Reviewed medical records:    Fell in December.   Pain down right arm and across chest and back.      Dizziness.    Past Medical History:   Diagnosis Date    Arthritis     Atrial fibrillation     Brain lesion     Cluster headache     Dissecting AAA (abdominal aortic aneurysm)     Elevated cholesterol     GERD (gastroesophageal reflux disease)     Headache, tension-type     Hypertension     Memory loss     Migraine     Myocardial infarct     Peripheral neuropathy 2017    Syncope and collapse 2024     Family History   Problem Relation Age of Onset    Heart disease Mother     Stroke Mother     Other Father          in accident at 42    Heart disease Sister     Other Brother         PPM    Heart disease Brother     Other Other         grandparents medical history unknown    No Known Problems Sister     No Known Problems Brother      Social History     Socioeconomic History    Marital status: Single   Tobacco Use    Smoking status: Every Day     Packs/day: .25     Types: Cigarettes     Start date: 1986     Passive exposure: Current    Smokeless tobacco: Never    Tobacco comments:     Using Chantix now to stop   Vaping Use    Vaping Use: Never used   Substance and Sexual Activity    Alcohol use: Not  "Currently    Drug use: Yes     Types: Marijuana     Comment: smokes marijuana daily    Sexual activity: Not Currently     Partners: Female     Birth control/protection: Abstinence       Review of Systems   Constitutional:  Positive for fatigue. Negative for activity change and unexpected weight change.   HENT:  Negative for facial swelling, hearing loss, tinnitus, trouble swallowing and voice change.    Eyes:  Negative for photophobia, pain and visual disturbance.   Respiratory:  Negative for apnea, cough and choking.    Cardiovascular:  Negative for chest pain.   Gastrointestinal:  Negative for constipation, nausea and vomiting.   Endocrine: Negative for cold intolerance.   Genitourinary:  Negative for difficulty urinating, frequency and urgency.   Musculoskeletal:  Positive for gait problem. Negative for arthralgias, back pain, myalgias, neck pain and neck stiffness.   Skin:  Negative for rash.   Allergic/Immunologic: Negative for immunocompromised state.   Neurological:  Positive for dizziness, syncope, speech difficulty, weakness and light-headedness. Negative for tremors, seizures, facial asymmetry, numbness and headaches.   Hematological:  Negative for adenopathy.   Psychiatric/Behavioral:  Positive for decreased concentration. Negative for confusion, hallucinations and sleep disturbance. The patient is nervous/anxious.        Objective     Vitals:    02/01/24 1408   BP: 142/80   Pulse: 69   SpO2: 96%   Weight: 106 kg (233 lb)   Height: 177.8 cm (70\")       Neurologic Exam     Mental Status   Oriented to person, place, and time.   Speech: speech is normal   Level of consciousness: alert  Knowledge: good and consistent with education.   Normal comprehension.     Cranial Nerves   Cranial nerves II through XII intact.     CN II   Visual fields full to confrontation.   Visual acuity: normal  Right visual field deficit: none  Left visual field deficit: none     CN III, IV, VI   Pupils are equal, round, and reactive " to light.  Extraocular motions are normal.   Nystagmus: none   Diplopia: none  Ophthalmoparesis: none  Upgaze: normal  Downgaze: normal  Conjugate gaze: present    CN V   Facial sensation intact.   Right corneal reflex: normal  Left corneal reflex: normal    CN VII   Right facial weakness: none  Left facial weakness: none    CN VIII   Hearing: intact    CN IX, X   Palate: symmetric  Right gag reflex: normal  Left gag reflex: normal    CN XI   Right sternocleidomastoid strength: normal  Left sternocleidomastoid strength: normal    CN XII   Tongue: not atrophic  Fasciculations: absent  Tongue deviation: none    Motor Exam   Muscle bulk: normal  Overall muscle tone: normal    Strength   Strength 5/5 throughout.     Sensory Exam   Light touch normal.     Gait, Coordination, and Reflexes     Gait  Gait: normal    Tremor   Resting tremor: absent  Intention tremor: absent  Action tremor: absent    Reflexes   Reflexes 2+ except as noted.       Physical Exam  Eyes:      Extraocular Movements: EOM normal.      Pupils: Pupils are equal, round, and reactive to light.   Neurological:      Mental Status: He is oriented to person, place, and time.      Cranial Nerves: Cranial nerves 2-12 are intact.      Motor: Motor strength is normal.     Gait: Gait is intact.   Psychiatric:         Speech: Speech normal.         Hospital Outpatient Visit on 06/14/2023   Component Date Value Ref Range Status    EF_3D-VOL 06/14/2023 70.0  % Final    LVIDd 06/14/2023 5.0  cm Final    LVIDs 06/14/2023 2.7  cm Final    IVSd 06/14/2023 1.26  cm Final    LVPWd 06/14/2023 1.21  cm Final    FS 06/14/2023 46.5  % Final    IVS/LVPW 06/14/2023 1.04  cm Final    ESV(cubed) 06/14/2023 18.8  ml Final    LV Sys Vol (BSA corrected) 06/14/2023 29.5  cm2 Final    EDV(cubed) 06/14/2023 122.8  ml Final    LV Carroll Vol (BSA corrected) 06/14/2023 53.5  cm2 Final    LV mass(C)d 06/14/2023 241.1  grams Final    EDV(MOD-sp4) 06/14/2023 118.0  ml Final    ESV(MOD-sp4)  06/14/2023 65.0  ml Final    SV(MOD-sp4) 06/14/2023 53.0  ml Final    SI(MOD-sp4) 06/14/2023 24.0  ml/m2 Final    EF(MOD-sp4) 06/14/2023 44.9  % Final    MV E max gulshan 06/14/2023 79.3  cm/sec Final    MV A max gulshan 06/14/2023 76.3  cm/sec Final    MV E/A 06/14/2023 1.04   Final    LA ESV Index (BP) 06/14/2023 16.8  ml/m2 Final    Med Peak E' Gulshan 06/14/2023 8.2  cm/sec Final    Lat Peak E' Gulshan 06/14/2023 8.7  cm/sec Final    Avg E/e' ratio 06/14/2023 9.38   Final    RVIDd 06/14/2023 3.1  cm Final    LA dimension (2D)  06/14/2023 3.3  cm Final    Ao pk gulshan 06/14/2023 100.4  cm/sec Final    Ao max PG 06/14/2023 4.0  mmHg Final    Ao mean PG 06/14/2023 2.46  mmHg Final    Ao V2 VTI 06/14/2023 24.4  cm Final    MV dec slope 06/14/2023 370.6  cm/sec2 Final    TR max gulshan 06/14/2023 221.3  cm/sec Final    TR max PG 06/14/2023 19.6  mmHg Final    RVSP(TR) 06/14/2023 29.6  mmHg Final    RAP systole 06/14/2023 10.0  mmHg Final    Ao root diam 06/14/2023 3.6  cm Final    ACS 06/14/2023 2.9  cm Final         Assessment & Plan     Problem List Items Addressed This Visit          Symptoms and Signs    Syncope and collapse - Primary (Chronic)    Current Assessment & Plan     EEG     Will review MRI     Start Depakote     Stop Wellbutrin          Relevant Orders    EEG (Hospital Performed)          No follow-ups on file.

## 2024-02-29 ENCOUNTER — HOSPITAL ENCOUNTER (OUTPATIENT)
Dept: NEUROLOGY | Facility: HOSPITAL | Age: 49
Discharge: HOME | End: 2024-02-29
Payer: COMMERCIAL

## 2024-02-29 DIAGNOSIS — R55 SYNCOPE AND COLLAPSE: ICD-10-CM

## 2024-02-29 PROCEDURE — 95819 EEG AWAKE AND ASLEEP: CPT

## 2024-04-02 ENCOUNTER — OFFICE VISIT (OUTPATIENT)
Dept: NEUROLOGY | Facility: CLINIC | Age: 49
End: 2024-04-02
Payer: COMMERCIAL

## 2024-04-02 VITALS
SYSTOLIC BLOOD PRESSURE: 138 MMHG | HEIGHT: 70 IN | HEART RATE: 75 BPM | WEIGHT: 256 LBS | DIASTOLIC BLOOD PRESSURE: 74 MMHG | BODY MASS INDEX: 36.65 KG/M2 | OXYGEN SATURATION: 96 %

## 2024-04-02 DIAGNOSIS — R55 SYNCOPE AND COLLAPSE: Primary | Chronic | ICD-10-CM

## 2024-04-02 PROCEDURE — 99214 OFFICE O/P EST MOD 30 MIN: CPT | Performed by: PSYCHIATRY & NEUROLOGY

## 2024-04-02 PROCEDURE — 3075F SYST BP GE 130 - 139MM HG: CPT | Performed by: PSYCHIATRY & NEUROLOGY

## 2024-04-02 PROCEDURE — 3078F DIAST BP <80 MM HG: CPT | Performed by: PSYCHIATRY & NEUROLOGY

## 2024-04-02 RX ORDER — DIVALPROEX SODIUM 500 MG/1
1000 TABLET, EXTENDED RELEASE ORAL DAILY
Qty: 60 TABLET | Refills: 5 | Status: SHIPPED | OUTPATIENT
Start: 2024-04-02 | End: 2025-04-02

## 2024-04-02 NOTE — PROGRESS NOTES
"Chief Complaint    AMS    Subjective        Jorge A Gordillo presents to [facility] NEUROLOGY  History of Present Illness    49 y.o. male returns in follow up.  Last visit on 24 ordered EEG, rx Depakote, stopped Wellbutrin.    MRI Brain, my review of films, 24 minimal flair signal changes.     EEG 24 normal     Spells continue twice a day unchanged frequency or intensity.    Tolerating Depakote.      No change in mood off Wellbutrin.      Gait is unsteady and feels dizzy.       Aortic dissection  s/p intravascular stenting.     Frequency is several times a day.       Occurs in any position.       Sudden onset of room moving, surge of pressure starts in head and moves down body in 4 - 5 seconds.  AMS, cannot speak.  Occurs twice a day.       HA frequency is daily.  Sharp stabbing.  Lasts for seconds.  Occurs every hour.       MRI Brain, 24, mild       Started on Wellbutrin 2021.        Reviewed medical records:     Fell in December.   Pain down right arm and across chest and back.       Dizziness.  Objective   Vital Signs:  /74   Pulse 75   Ht 177.8 cm (70\")   Wt 116 kg (256 lb)   SpO2 96%   BMI 36.73 kg/m²   Estimated body mass index is 36.73 kg/m² as calculated from the following:    Height as of this encounter: 177.8 cm (70\").    Weight as of this encounter: 116 kg (256 lb).       Neurologic Exam     Mental Status   Oriented to person, place, and time.   Speech: speech is normal   Level of consciousness: alert  Knowledge: good and consistent with education.   Normal comprehension.     Cranial Nerves   Cranial nerves II through XII intact.     CN II   Visual fields full to confrontation.   Visual acuity: normal  Right visual field deficit: none  Left visual field deficit: none     CN III, IV, VI   Pupils are equal, round, and reactive to light.  Extraocular motions are normal.   Nystagmus: none   Diplopia: none  Ophthalmoparesis: none  Upgaze: normal  Downgaze: " normal  Conjugate gaze: present    CN V   Facial sensation intact.   Right corneal reflex: normal  Left corneal reflex: normal    CN VII   Right facial weakness: none  Left facial weakness: none    CN VIII   Hearing: intact    CN IX, X   Palate: symmetric  Right gag reflex: normal  Left gag reflex: normal    CN XI   Right sternocleidomastoid strength: normal  Left sternocleidomastoid strength: normal    CN XII   Tongue: not atrophic  Fasciculations: absent  Tongue deviation: none    Motor Exam   Muscle bulk: normal  Overall muscle tone: normal    Strength   Strength 5/5 throughout.     Sensory Exam   Light touch normal.     Gait, Coordination, and Reflexes     Gait  Gait: normal    Tremor   Resting tremor: absent  Intention tremor: absent  Action tremor: absent    Reflexes   Reflexes 2+ except as noted.          Physical Exam  Eyes:      Extraocular Movements: EOM normal.      Pupils: Pupils are equal, round, and reactive to light.   Neurological:      Mental Status: He is oriented to person, place, and time.      Cranial Nerves: Cranial nerves 2-12 are intact.      Motor: Motor strength is normal.     Gait: Gait is intact.   Psychiatric:         Speech: Speech normal.        Result Review :    The following data was reviewed by: Antonio Hartmann MD on 04/02/2024:  Common labs          5/10/2023    07:55 6/14/2023    10:46   Common Labs   Glucose 95     101    BUN  20    Creatinine  1.43    Sodium  140    Potassium 4.2     4.4    Chloride 103     103    Calcium  9.6    Albumin  4.4    Total Bilirubin  0.4    Alkaline Phosphatase  80    AST (SGOT)  14    ALT (SGPT)  11    WBC 8.65     8.20    Hemoglobin 13.9     14.3    Hematocrit 42.6     44.7    Platelets 153     139    Total Cholesterol  134    Triglycerides  67    HDL Cholesterol  50    LDL Cholesterol   70       Details          This result is from an external source.             Data reviewed : Radiologic studies MRI Brain  and EEG              Assessment and  Plan     Diagnoses and all orders for this visit:    1. Syncope and collapse (Primary)  Assessment & Plan:  Refractory spells     Increase Depakote ER 1000 mg qhs       Other orders  -     divalproex (DEPAKOTE ER) 500 MG 24 hr tablet; Take 2 tablets by mouth Daily. Take one tablet at bedtime  Dispense: 60 tablet; Refill: 5             Follow Up     No follow-ups on file.  Patient was given instructions and counseling regarding his condition or for health maintenance advice. Please see specific information pulled into the AVS if appropriate.

## 2024-06-12 ENCOUNTER — OFFICE VISIT (OUTPATIENT)
Dept: CARDIOLOGY | Facility: CLINIC | Age: 49
End: 2024-06-12
Payer: COMMERCIAL

## 2024-06-12 VITALS
HEART RATE: 65 BPM | BODY MASS INDEX: 34.1 KG/M2 | HEIGHT: 70 IN | SYSTOLIC BLOOD PRESSURE: 126 MMHG | WEIGHT: 238.2 LBS | DIASTOLIC BLOOD PRESSURE: 60 MMHG

## 2024-06-12 DIAGNOSIS — I71.03 DISSECTION OF THORACOABDOMINAL AORTA: ICD-10-CM

## 2024-06-12 DIAGNOSIS — R00.2 PALPITATIONS: ICD-10-CM

## 2024-06-12 DIAGNOSIS — G44.89 OTHER HEADACHE SYNDROME: ICD-10-CM

## 2024-06-12 DIAGNOSIS — E78.00 HYPERCHOLESTEROLEMIA: ICD-10-CM

## 2024-06-12 DIAGNOSIS — I10 PRIMARY HYPERTENSION: ICD-10-CM

## 2024-06-12 DIAGNOSIS — I48.0 PAF (PAROXYSMAL ATRIAL FIBRILLATION): Primary | ICD-10-CM

## 2024-06-12 PROCEDURE — 3074F SYST BP LT 130 MM HG: CPT | Performed by: NURSE PRACTITIONER

## 2024-06-12 PROCEDURE — 3078F DIAST BP <80 MM HG: CPT | Performed by: NURSE PRACTITIONER

## 2024-06-12 PROCEDURE — 99214 OFFICE O/P EST MOD 30 MIN: CPT | Performed by: NURSE PRACTITIONER

## 2024-06-12 PROCEDURE — 93000 ELECTROCARDIOGRAM COMPLETE: CPT | Performed by: NURSE PRACTITIONER

## 2024-06-12 NOTE — PROGRESS NOTES
Chief Complaint   Patient presents with    Follow-up     Pt is here for cardiac follow up. Pt denies CP, SOB, and palpations/dizziness. C/o frequent headaches   [facility] done a CT and found a blockage in vertebra artery   Pt does take a daily aspirin        Med Refill     Pt request 30 day refills to be sent to [facility] .  Medications were verified by pt.      lab work     Pt states their last labs were in the last 3 weeks with PCP.         Cardiac Complaints  none      Subjective   Jorge A Gordillo is a 49 y.o. male with HTN, headaches, PAF, aortic dissection, and syncope. He was initially referred in 2021 for consult.  He has history of dissection thoracic aorta with endovascular stenting in [place].  He had a brief episode of atrial fibrillation with spontaneous conversion to sinus.  He signed out AMA.  Due to chest pain he had repeat CT scan showing stable aorta, again signed out AMA.On 10/22/2020 Lexiscan stress test showed diaphragmatic attenuation but could not rule out old MI.  Echocardiogram showed LVEF around 55%.  Previous [facility] loop recorder reached end-of-life, removed.  On 3/9/2022 cardiac catheterization showed ectatic left coronary system but no significant CAD.  EF was around 60%.In September 2023 seen by Dr. Salazar at .  CTA and duplex showed stable appearing dissection with no increased in size, TEVAR in good position and no evidence of malperfusion. Repeat CT Jan 2024 abnormal, possible dissection past the stent. Was urged to follow with vascular at [facility], but they declined the patient as his surgery was done by Dr. Salazar back. Went to ED in [facility] in June. He damits that he had a headache at that time and CT was done. Small vertebral stenosis noted, but not felt to be cause of his headache.He was urged to continue to follow with neurology and follow with cardiology for holter and possible PAF.                   Cardiac History  Past Surgical History:   Procedure Laterality  Date    CARDIAC CATHETERIZATION  04/26/2019    - Normal Coronaries. EF 55%    CARDIAC CATHETERIZATION  03/09/2022    Ectatic Coronaries. Low BP and HR    CARDIOVASCULAR STRESS TEST  03/26/2018    @ [facility]. Dr. Stringer- Adenosine- EF 42%. Inferior Infarct    CARDIOVASCULAR STRESS TEST  10/22/2021    Lexiscan- EF 46%. Reverse Redistribution Inferior wall    CAROTID STENT  2021    ECHO - CONVERTED  09/17/2021    @ . EF 57%. Trace MR    ECHO - CONVERTED  03/27/2018    @ [facility]. - EF 60%. Trace MR    ECHO - CONVERTED  10/22/2021    TLS. EF 55%. LA- 3.9 Cm. Trace-Mild MR. RVSP- 14 mmHg    ECHO - CONVERTED  06/14/2023    TLS. EF 60%. Trace-Mild MR. RVSP- 30 mmHg    KNEE SURGERY      OTHER SURGICAL HISTORY  10/02/2021    CTA- Patent Thoracic stent.AAA distal to stent    OTHER SURGICAL HISTORY  09/17/2021    CTA- Dissection of thoracis aorta distal to (L) Subclavian artery    OTHER SURGICAL HISTORY  06/04/2018    Loop recorder placement    OTHER SURGICAL HISTORY  09/22/2021    @ . Thoracic endovascular stent    OTHER SURGICAL HISTORY  11/08/2021    Pulse O2- Borderline       Current Outpatient Medications   Medication Sig Dispense Refill    Aspirin Low Dose 81 MG EC tablet Take 1 tablet by mouth Daily. 30 tablet 6    divalproex (DEPAKOTE ER) 500 MG 24 hr tablet Take 2 tablets by mouth Daily. Take one tablet at bedtime 60 tablet 5    famotidine (PEPCID) 40 MG tablet Take 1 tablet by mouth Daily.      hydrOXYzine (ATARAX) 25 MG tablet Take 1 tablet by mouth 3 (Three) Times a Day As Needed for Itching.      isosorbide dinitrate (ISORDIL) 20 MG tablet Take 1 tablet by mouth 3 (Three) Times a Day. 270 tablet 3    lisinopril (PRINIVIL,ZESTRIL) 10 MG tablet Take 1 tablet by mouth Daily. (Patient taking differently: Take 0.5 tablets by mouth Daily.) 90 tablet 3    Metoclopramide HCl 10 MG tablet dispersible       metoprolol succinate XL (TOPROL-XL) 25 MG 24 hr tablet Take 1 tablet by mouth Daily. 30 tablet 6     NIFEdipine CC (ADALAT CC) 30 MG 24 hr tablet Take 1 tablet by mouth Daily. 30 tablet 6    ondansetron (ZOFRAN) 4 MG tablet Take 1 tablet by mouth 2 (Two) Times a Day As Needed.      pantoprazole (Protonix) 40 MG EC tablet Take 1 tablet by mouth Daily. 90 tablet 3    rosuvastatin (CRESTOR) 5 MG tablet Take 1 tablet by mouth Every Night. 30 tablet 6     No current facility-administered medications for this visit.       Patient has no known allergies.    Past Medical History:   Diagnosis Date    Arthritis     Atrial fibrillation     Brain lesion     Cluster headache     Dissecting AAA (abdominal aortic aneurysm)     Elevated cholesterol     GERD (gastroesophageal reflux disease)     Headache, tension-type     Hypertension     Memory loss     Migraine     Myocardial infarct     Peripheral neuropathy     Syncope and collapse 2024       Social History     Socioeconomic History    Marital status:    Tobacco Use    Smoking status: Former     Current packs/day: 0.00     Average packs/day: 0.2 packs/day for 38.2 years (9.5 ttl pk-yrs)     Types: Cigarettes     Start date: 1986     Quit date: 3/30/2024     Years since quittin.2     Passive exposure: Current    Smokeless tobacco: Never    Tobacco comments:     Using Chantix now to stop   Vaping Use    Vaping status: Never Used   Substance and Sexual Activity    Alcohol use: Not Currently    Drug use: Yes     Types: Marijuana     Comment: smokes marijuana daily    Sexual activity: Not Currently     Partners: Female     Birth control/protection: Abstinence       Family History   Problem Relation Age of Onset    Heart disease Mother     Stroke Mother     Other Father          in accident at 42    Heart disease Sister     Other Brother         PPM    Heart disease Brother     Other Other         grandparents medical history unknown    No Known Problems Sister     No Known Problems Brother        Review of Systems   Constitutional:  "Negative for malaise/fatigue and night sweats.   Cardiovascular:  Negative for chest pain, claudication, dyspnea on exertion, irregular heartbeat, leg swelling, near-syncope, orthopnea, palpitations and syncope.   Respiratory:  Negative for cough, shortness of breath and wheezing.    Musculoskeletal:  Positive for stiffness. Negative for back pain and joint pain.   Gastrointestinal:  Negative for anorexia, heartburn, nausea and vomiting.   Genitourinary:  Negative for dysuria, hematuria, hesitancy and nocturia.   Neurological:  Positive for headaches and light-headedness.   Psychiatric/Behavioral:  Negative for depression and memory loss. The patient is not nervous/anxious.            Objective     /60 (BP Location: Left arm, Patient Position: Sitting)   Pulse 65   Ht 177.8 cm (70\")   Wt 108 kg (238 lb 3.2 oz)   BMI 34.18 kg/m²     Constitutional:       Appearance: Not in distress.   Eyes:      Pupils: Pupils are equal, round, and reactive to light.   HENT:      Nose: Nose normal.   Pulmonary:      Effort: Pulmonary effort is normal.      Breath sounds: Normal breath sounds.   Cardiovascular:      PMI at left midclavicular line. Normal rate. Regular rhythm.      Murmurs: There is a systolic murmur.   Abdominal:      Palpations: Abdomen is soft.   Musculoskeletal: Normal range of motion.      Cervical back: Normal range of motion and neck supple. Skin:     General: Skin is warm and dry.   Neurological:      Mental Status: Alert.           ECG 12 Lead    Date/Time: 6/12/2024 10:34 AM  Performed by: Idalmis Wills APRN    Authorized by: Idalmis Wills APRN  Comparison: compared with previous ECG   Rhythm: sinus rhythm  BPM: 64  Conduction: 1st degree AV block    Clinical impression: abnormal EKG  Comments: Normal QT               Diagnoses and all orders for this visit:    1. PAF (paroxysmal atrial fibrillation) (Primary)  -     Holter Monitor - 72 Hour Up To 15 Days; Future  -     ECG 12 Lead  -     " Comprehensive Metabolic Panel; Future  -     Sedimentation Rate; Future    2. Palpitations  -     Holter Monitor - 72 Hour Up To 15 Days; Future    3. Dissection of thoracoabdominal aorta    4. Primary hypertension    5. Hypercholesterolemia    6. Other headache syndrome  -     Holter Monitor - 72 Hour Up To 15 Days; Future  -     Sedimentation Rate; Future               PAF: EKG done today showed NSR with first degree block and normal QT.  Toprol continued at same. Will advise 2 week holter to assess for runs of PAF. If noted, will add NOAC to current ASA therapy.    HTN: BP is stable. Continue with lisinopril, toprol, and nifedipine. Limited sodium urged.    Cardiac status: CP denied on isordil therapy.  ASA continued.     Hyperlipidemia: Will continue with crestor. FLP with your office. Limited  carb diet urged.    Headache: Had CT at [facility], did see vertebral stenosis, did not feel cause of headache, wanted afib ruled out. Holter advised. Encouraged to continue to follow with neuro. Advised to check ESR. K low in hospital, urged to recheck.    Aortic dissection s/p TEVAR: Surgery with Dr. Salazar. CT in Jan was abnormal for possible dissection past the stent. Tried to refer to [facility], but patient urged to follow with  as they did the surgery. Has he seen them? Will contact your office.  Continue ASA.    Refills per request.    BMI noted at 34.18, good cardiac diet advised.    6 month follow up recommended, sooner if needed.       Problems Addressed this Visit          Cardiac and Vasculature    Dissection of thoracoabdominal aorta    PAF (paroxysmal atrial fibrillation) - Primary    Relevant Orders    Holter Monitor - 72 Hour Up To 15 Days    ECG 12 Lead    Comprehensive Metabolic Panel    Sedimentation Rate    Primary hypertension     Other Visit Diagnoses       Palpitations        Relevant Orders    Holter Monitor - 72 Hour Up To 15 Days    Hypercholesterolemia        Other headache syndrome         Relevant Orders    Holter Monitor - 72 Hour Up To 15 Days    Sedimentation Rate          Diagnoses         Codes Comments    PAF (paroxysmal atrial fibrillation)    -  Primary ICD-10-CM: I48.0  ICD-9-CM: 427.31     Palpitations     ICD-10-CM: R00.2  ICD-9-CM: 785.1     Dissection of thoracoabdominal aorta     ICD-10-CM: I71.03  ICD-9-CM: 441.03     Primary hypertension     ICD-10-CM: I10  ICD-9-CM: 401.9     Hypercholesterolemia     ICD-10-CM: E78.00  ICD-9-CM: 272.0     Other headache syndrome     ICD-10-CM: G44.89  ICD-9-CM: 339.89                            Electronically signed by Idalmis Wills, APRN June 12, 2024 11:37 EDT

## 2024-06-24 ENCOUNTER — OFFICE VISIT (OUTPATIENT)
Dept: NEUROLOGY | Facility: CLINIC | Age: 49
End: 2024-06-24
Payer: COMMERCIAL

## 2024-06-24 VITALS
HEART RATE: 70 BPM | WEIGHT: 233 LBS | TEMPERATURE: 96.9 F | OXYGEN SATURATION: 97 % | HEIGHT: 70 IN | BODY MASS INDEX: 33.36 KG/M2 | DIASTOLIC BLOOD PRESSURE: 72 MMHG | SYSTOLIC BLOOD PRESSURE: 130 MMHG

## 2024-06-24 DIAGNOSIS — R55 SYNCOPE AND COLLAPSE: Primary | Chronic | ICD-10-CM

## 2024-06-24 PROCEDURE — 3078F DIAST BP <80 MM HG: CPT | Performed by: PSYCHIATRY & NEUROLOGY

## 2024-06-24 PROCEDURE — 99214 OFFICE O/P EST MOD 30 MIN: CPT | Performed by: PSYCHIATRY & NEUROLOGY

## 2024-06-24 PROCEDURE — 1160F RVW MEDS BY RX/DR IN RCRD: CPT | Performed by: PSYCHIATRY & NEUROLOGY

## 2024-06-24 PROCEDURE — 3075F SYST BP GE 130 - 139MM HG: CPT | Performed by: PSYCHIATRY & NEUROLOGY

## 2024-06-24 PROCEDURE — 1159F MED LIST DOCD IN RCRD: CPT | Performed by: PSYCHIATRY & NEUROLOGY

## 2024-06-24 RX ORDER — AZELASTINE HYDROCHLORIDE 137 UG/1
SPRAY, METERED NASAL
COMMUNITY
Start: 2024-05-25

## 2024-06-24 RX ORDER — DOXYCYCLINE HYCLATE 100 MG
TABLET ORAL
COMMUNITY

## 2024-06-24 RX ORDER — OMEPRAZOLE 20 MG/1
1 CAPSULE, DELAYED RELEASE ORAL EVERY 12 HOURS SCHEDULED
COMMUNITY
Start: 2024-03-25

## 2024-06-24 RX ORDER — ONDANSETRON 8 MG/1
TABLET, ORALLY DISINTEGRATING ORAL
COMMUNITY

## 2024-06-24 RX ORDER — VARENICLINE TARTRATE 1 MG/1
1 TABLET, FILM COATED ORAL 2 TIMES DAILY
COMMUNITY

## 2024-06-24 NOTE — PROGRESS NOTES
"Chief Complaint  Hospital Follow Up Visit    Linda Gordillo presents to [facility] NEUROLOGY  History of Present Illness    49 y.o. male returns in follow up.  Last visit on 24 increased Depakote.      6/3/24  ED sudden onset of severe HA.  OD throbbing.      CTA L vert occlusion     MRI Brain, 24 minimal flair signal changes.      EEG 24 normal      Missing dosages intermittently.      Tolerating Depakote.       HA lasts for a few seconds.  Severe pain over the eyes.      Gait is unsteady and feels dizzy.       Aortic dissection  s/p intravascular stenting.     Frequency is several times a day.       Occurs in any position.       Sudden onset of room moving, surge of pressure starts in head and moves down body in 4 - 5 seconds.  AMS, cannot speak.  Occurs twice a day.       HA frequency is daily.  Sharp stabbing.  Lasts for seconds.  Occurs every hour.       MRI Brain, 24, mild       Started on Wellbutrin 2021.        Reviewed medical records:     Fell in December.   Pain down right arm and across chest and back.       Dizziness.     Objective   Vital Signs:  /72   Pulse 70   Temp 96.9 °F (36.1 °C)   Ht 177.8 cm (70\")   Wt 106 kg (233 lb)   SpO2 97%   BMI 33.43 kg/m²   Estimated body mass index is 33.43 kg/m² as calculated from the following:    Height as of this encounter: 177.8 cm (70\").    Weight as of this encounter: 106 kg (233 lb).       Neurologic Exam     Mental Status   Oriented to person, place, and time.   Speech: speech is normal   Level of consciousness: alert  Knowledge: good and consistent with education.   Normal comprehension.     Cranial Nerves   Cranial nerves II through XII intact.     CN II   Visual fields full to confrontation.   Visual acuity: normal  Right visual field deficit: none  Left visual field deficit: none     CN III, IV, VI   Pupils are equal, round, and reactive to light.  Extraocular motions are normal. "   Nystagmus: none   Diplopia: none  Ophthalmoparesis: none  Upgaze: normal  Downgaze: normal  Conjugate gaze: present    CN V   Facial sensation intact.   Right corneal reflex: normal  Left corneal reflex: normal    CN VII   Right facial weakness: none  Left facial weakness: none    CN VIII   Hearing: intact    CN IX, X   Palate: symmetric  Right gag reflex: normal  Left gag reflex: normal    CN XI   Right sternocleidomastoid strength: normal  Left sternocleidomastoid strength: normal    CN XII   Tongue: not atrophic  Fasciculations: absent  Tongue deviation: none    Motor Exam   Muscle bulk: normal  Overall muscle tone: normal    Strength   Strength 5/5 throughout.     Sensory Exam   Light touch normal.     Gait, Coordination, and Reflexes     Gait  Gait: normal    Tremor   Resting tremor: absent  Intention tremor: absent  Action tremor: absent    Reflexes   Reflexes 2+ except as noted.            Physical Exam  Eyes:      Extraocular Movements: EOM normal.      Pupils: Pupils are equal, round, and reactive to light.   Neurological:      Mental Status: He is oriented to person, place, and time.      Cranial Nerves: Cranial nerves 2-12 are intact.      Motor: Motor strength is normal.     Gait: Gait is intact.   Psychiatric:         Speech: Speech normal.        Result Review :    The following data was reviewed by: Antonio Hartmann MD on 06/24/2024:  Common labs          6/3/2024    01:42   Common Labs   Glucose 120       Potassium 3.3       Chloride 106       WBC 10.62       Hemoglobin 13.5       Hematocrit 40.9       Platelets 165       Hemoglobin A1C 5.7          Details          This result is from an external source.             Data reviewed : Radiologic studies HCT/CTA H&N and Recent hospitalization notes [facility]              Assessment and Plan     Diagnoses and all orders for this visit:    1. Syncope and collapse (Primary)  Assessment & Plan:  Spells not controlled     Increase compliance of Depakote     CBC,  CMP, VPA     Orders:  -     CBC & Differential; Future  -     Comprehensive Metabolic Panel; Future  -     Valproic Acid Level, Total; Future             Follow Up     No follow-ups on file.  Patient was given instructions and counseling regarding his condition or for health maintenance advice. Please see specific information pulled into the AVS if appropriate.

## 2024-07-18 DIAGNOSIS — E78.00 HYPERCHOLESTEROLEMIA: ICD-10-CM

## 2024-07-18 DIAGNOSIS — R42 DIZZINESS: ICD-10-CM

## 2024-07-18 DIAGNOSIS — I10 PRIMARY HYPERTENSION: ICD-10-CM

## 2024-07-18 DIAGNOSIS — F17.200 NICOTINE DEPENDENCE, UNSPECIFIED, UNCOMPLICATED: ICD-10-CM

## 2024-07-18 DIAGNOSIS — I71.03 DISSECTION OF THORACOABDOMINAL AORTA: ICD-10-CM

## 2024-07-23 RX ORDER — METOPROLOL SUCCINATE 25 MG/1
25 TABLET, EXTENDED RELEASE ORAL DAILY
Qty: 30 TABLET | Refills: 6 | Status: SHIPPED | OUTPATIENT
Start: 2024-07-23

## 2024-07-23 RX ORDER — VARENICLINE TARTRATE 1 MG/1
1 TABLET, FILM COATED ORAL 2 TIMES DAILY
Qty: 56 TABLET | Refills: 6 | OUTPATIENT
Start: 2024-07-23

## 2024-07-23 RX ORDER — ROSUVASTATIN CALCIUM 5 MG/1
5 TABLET, COATED ORAL
Qty: 30 TABLET | Refills: 6 | Status: SHIPPED | OUTPATIENT
Start: 2024-07-23

## 2024-07-23 RX ORDER — ASPIRIN 81 MG/1
81 TABLET, COATED ORAL DAILY
Qty: 30 TABLET | Refills: 6 | Status: SHIPPED | OUTPATIENT
Start: 2024-07-23

## 2024-08-18 DIAGNOSIS — I10 PRIMARY HYPERTENSION: ICD-10-CM

## 2024-08-19 RX ORDER — NIFEDIPINE 30 MG
30 TABLET, EXTENDED RELEASE ORAL DAILY
Qty: 30 TABLET | Refills: 3 | Status: SHIPPED | OUTPATIENT
Start: 2024-08-19

## 2024-08-20 RX ORDER — DIVALPROEX SODIUM 500 MG/1
1000 TABLET, EXTENDED RELEASE ORAL DAILY
Qty: 60 TABLET | Refills: 5 | Status: SHIPPED | OUTPATIENT
Start: 2024-08-20 | End: 2025-08-20

## 2024-08-20 NOTE — TELEPHONE ENCOUNTER
Caller: [facility] - [place], [place] - [address] - [phone] [facility] [phone] FX    Relationship: Pharmacy      Requested Prescriptions:   Requested Prescriptions     Pending Prescriptions Disp Refills    divalproex (DEPAKOTE ER) 500 MG 24 hr tablet 60 tablet 5     Sig: Take 2 tablets by mouth Daily. Take one tablet at bedtime        Pharmacy where request should be sent: [facility] - [place] - [address] - [phone] [facility] [phone] FX     Last office visit with prescribing clinician: 6/24/2024   Last telemedicine visit with prescribing clinician: Visit date not found   Next office visit with prescribing clinician: 12/26/2024     Additional details provided by patient: IRON WITH THE PHARMACY CALLED AND STATED THAT HE HAS 6 DOSES LEFT (3 DAYS)    Does the patient have less than a 3 day supply:  [x] Yes  [] No    Would you like a call back once the refill request has been completed: [] Yes [x] No    If the office needs to give you a call back, can they leave a voicemail: [] Yes [x] No    Alejandro Balbuena Rep   08/20/24 08:40 EDT

## 2024-08-20 NOTE — TELEPHONE ENCOUNTER
Rx Refill Note  Requested Prescriptions     Pending Prescriptions Disp Refills    divalproex (DEPAKOTE ER) 500 MG 24 hr tablet 60 tablet 5     Sig: Take 2 tablets by mouth Daily. Take one tablet at bedtime      Last filled:  04/02/2025 w/5  Last office visit with prescribing clinician: 6/24/2024      Next office visit with prescribing clinician: 12/26/2024     Mony Christopher MA  08/20/24, 08:46 EDT

## 2024-08-22 NOTE — TELEPHONE ENCOUNTER
PHARMACY CALLING AGAIN -     PHARMACY HAS A QUESTION ON DIRECTION ON RX      PLEASE CALL ASAP IRON [phone]

## 2024-12-19 ENCOUNTER — OFFICE VISIT (OUTPATIENT)
Dept: CARDIOLOGY | Facility: CLINIC | Age: 49
End: 2024-12-19
Payer: COMMERCIAL

## 2024-12-19 VITALS
BODY MASS INDEX: 33.5 KG/M2 | HEIGHT: 70 IN | DIASTOLIC BLOOD PRESSURE: 78 MMHG | SYSTOLIC BLOOD PRESSURE: 140 MMHG | WEIGHT: 234 LBS | HEART RATE: 74 BPM

## 2024-12-19 DIAGNOSIS — I10 PRIMARY HYPERTENSION: ICD-10-CM

## 2024-12-19 DIAGNOSIS — R07.2 PRECORDIAL PAIN: ICD-10-CM

## 2024-12-19 DIAGNOSIS — E78.00 HYPERCHOLESTEROLEMIA: ICD-10-CM

## 2024-12-19 DIAGNOSIS — E66.811 OBESITY (BMI 30.0-34.9): ICD-10-CM

## 2024-12-19 DIAGNOSIS — F17.200 SMOKING: ICD-10-CM

## 2024-12-19 DIAGNOSIS — I71.03 DISSECTION OF THORACOABDOMINAL AORTA: ICD-10-CM

## 2024-12-19 DIAGNOSIS — I47.10 PSVT (PAROXYSMAL SUPRAVENTRICULAR TACHYCARDIA): ICD-10-CM

## 2024-12-19 DIAGNOSIS — I48.0 PAF (PAROXYSMAL ATRIAL FIBRILLATION): Primary | ICD-10-CM

## 2024-12-19 DIAGNOSIS — R42 DIZZINESS: ICD-10-CM

## 2024-12-19 PROCEDURE — 3078F DIAST BP <80 MM HG: CPT | Performed by: NURSE PRACTITIONER

## 2024-12-19 PROCEDURE — 99214 OFFICE O/P EST MOD 30 MIN: CPT | Performed by: NURSE PRACTITIONER

## 2024-12-19 PROCEDURE — 93000 ELECTROCARDIOGRAM COMPLETE: CPT | Performed by: NURSE PRACTITIONER

## 2024-12-19 PROCEDURE — 3077F SYST BP >= 140 MM HG: CPT | Performed by: NURSE PRACTITIONER

## 2024-12-19 RX ORDER — CYCLOBENZAPRINE HCL 10 MG
10 TABLET ORAL 3 TIMES DAILY PRN
COMMUNITY

## 2024-12-19 RX ORDER — ISOSORBIDE DINITRATE 20 MG/1
20 TABLET ORAL 3 TIMES DAILY
Qty: 270 TABLET | Refills: 3 | Status: SHIPPED | OUTPATIENT
Start: 2024-12-19

## 2024-12-19 RX ORDER — METOPROLOL SUCCINATE 25 MG/1
25 TABLET, EXTENDED RELEASE ORAL DAILY
Qty: 90 TABLET | Refills: 2 | Status: SHIPPED | OUTPATIENT
Start: 2024-12-19

## 2024-12-19 RX ORDER — ROSUVASTATIN CALCIUM 5 MG/1
5 TABLET, COATED ORAL
Qty: 90 TABLET | Refills: 2 | Status: SHIPPED | OUTPATIENT
Start: 2024-12-19

## 2024-12-19 RX ORDER — LISINOPRIL 10 MG/1
10 TABLET ORAL DAILY
Qty: 90 TABLET | Refills: 3 | Status: SHIPPED | OUTPATIENT
Start: 2024-12-19

## 2024-12-19 RX ORDER — NIFEDIPINE 30 MG
30 TABLET, EXTENDED RELEASE ORAL DAILY
Qty: 90 TABLET | Refills: 3 | Status: SHIPPED | OUTPATIENT
Start: 2024-12-19

## 2024-12-19 RX ORDER — DIVALPROEX SODIUM 500 MG/1
500 TABLET, FILM COATED, EXTENDED RELEASE ORAL 2 TIMES DAILY
COMMUNITY

## 2024-12-19 RX ORDER — LISINOPRIL 5 MG/1
5 TABLET ORAL DAILY
COMMUNITY
End: 2024-12-19

## 2024-12-19 RX ORDER — IBUPROFEN 800 MG/1
800 TABLET, FILM COATED ORAL DAILY
COMMUNITY

## 2024-12-19 RX ORDER — LIDOCAINE 4 G/G
1 PATCH TOPICAL DAILY PRN
COMMUNITY

## 2024-12-19 NOTE — PROGRESS NOTES
Chief Complaint   Patient presents with    Follow-up     For cardiac management. Patient is on aspirin. Patient was in  ER on 10/26/24 for abdominal pain, all records are in chart. States that he has had chest pain, states that it can be sharp and dull. States that shortness of breath is about the same as before. States that he does have palpitations. States that he feels like an electrical surge that starts at his head and travels down his body at times. State that he does have dizziness, can happen with position changes or randomly.     Med Refill     Needs refills on cardiac medications including aspirin. 30 day supplies to [facility]. Brought medication list with visit.     other     May be having surgery on neck with Dr. Jim Muro, has a bone spur pressing on esophagus. Is having to do physical therapy now, but does not know if he will be able to complete.       Cardiac Complaints  chest pressure/discomfort and dyspnea      Subjective   Jorge A Gordillo is a 49 y.o. male with HTN, headaches, PAF, aortic dissection, and syncope. He was initially referred in 2021 for consult.  He has history of dissection thoracic aorta with endovascular stenting in [place].  He had a brief episode of atrial fibrillation with spontaneous conversion to sinus.  He signed out AMA.  Due to chest pain he had repeat CT scan showing stable aorta, again signed out AMA.On 10/22/2020 Lexiscan stress test showed diaphragmatic attenuation but could not rule out old MI.  Echocardiogram showed LVEF around 55%.  Previous [facility] loop recorder reached end-of-life, removed.  On 3/9/2022 cardiac catheterization showed ectatic left coronary system but no significant CAD.  EF was around 60%.In September 2023 seen by Dr. Salazar at .  CTA and duplex showed stable appearing dissection with no increased in size, TEVAR in good position and no evidence of malperfusion. Repeat CT Jan 2024 abnormal, possible dissection past the stent. Was urged to  follow with vascular at [facility], but they declined the patient as his surgery was done by Dr. Salazar. Went to ED in [place] in June 2024. He admitted that he had a headache at that time and CT was done. Small vertebral stenosis noted, but not felt to be cause of his headache.He was urged to continue to follow with neurology and follow with cardiology for holter and possible PAF. Holter 7/2024 showed average HR 78 with rare PAC/PVC, no PAF.     Comes today for follow up and no new concerns voiced. Went to ER at  Oct 2024 for abdominal pain CT of chest showed no leak of his endograft. He does continue to have some issues with sharp chest pain, SOA, and palpitations. Dizziness noted on occasional position changes. Is having a lot of trouble with his neck, followed by [facility], thinks that he is having issues with this. Labs from  in October showed: Lipase 74, proBNP 171, Trop T 10, Mag 1.8, Na 135, K 4.4, BUN 23, Creatinine 1.2, GFR 73.7, lactic acid 1.2. Still smoking despite concerns.              Cardiac History  Past Surgical History:   Procedure Laterality Date    CARDIAC CATHETERIZATION  04/26/2019    - Normal Coronaries. EF 55%    CARDIAC CATHETERIZATION  03/09/2022    Ectatic Coronaries. Low BP and HR    CARDIOVASCULAR STRESS TEST  03/26/2018    @ [facility]. Dr. Stringer- Adenosine- EF 42%. Inferior Infarct    CARDIOVASCULAR STRESS TEST  10/22/2021    Lexiscan- EF 46%. Reverse Redistribution Inferior wall    CAROTID STENT  2021    CONVERTED (HISTORICAL) HOLTER  07/16/2024    11 Days. Avg 78. . 3 SVT    ECHO - CONVERTED  09/17/2021    @ . EF 57%. Trace MR    ECHO - CONVERTED  03/27/2018    @ [facility]. - EF 60%. Trace MR    ECHO - CONVERTED  10/22/2021    TLS. EF 55%. LA- 3.9 Cm. Trace-Mild MR. RVSP- 14 mmHg    ECHO - CONVERTED  06/14/2023    TLS. EF 60%. Trace-Mild MR. RVSP- 30 mmHg    KNEE SURGERY      OTHER SURGICAL HISTORY  10/02/2021    CTA- Patent Thoracic stent.AAA distal to stent     OTHER SURGICAL HISTORY  09/17/2021    CTA- Dissection of thoracis aorta distal to (L) Subclavian artery    OTHER SURGICAL HISTORY  06/04/2018    Loop recorder placement    OTHER SURGICAL HISTORY  09/22/2021    @ [facility]. Thoracic endovascular stent    OTHER SURGICAL HISTORY  11/08/2021    Pulse O2- Borderline    OTHER SURGICAL HISTORY  10/26/2024    CTA chest at : No endograft leak       Current Outpatient Medications   Medication Sig Dispense Refill    Aspirin Low Dose 81 MG EC tablet TAKE ONE TABLET BY MOUTH daily 30 tablet 6    Azelastine HCl 137 MCG/SPRAY solution       cyclobenzaprine (FLEXERIL) 10 MG tablet Take 1 tablet by mouth 3 (Three) Times a Day As Needed for Muscle Spasms.      divalproex (DEPAKOTE ER) 500 MG 24 hr tablet Take 1 tablet by mouth 2 (Two) Times a Day.      famotidine (PEPCID) 40 MG tablet Take 1 tablet by mouth Daily.      hydrOXYzine (ATARAX) 25 MG tablet Take 1 tablet by mouth 3 (Three) Times a Day As Needed for Itching.      ibuprofen (ADVIL,MOTRIN) 800 MG tablet Take 1 tablet by mouth Daily.      isosorbide dinitrate (ISORDIL) 20 MG tablet Take 1 tablet by mouth 3 (Three) Times a Day. 270 tablet 3    Lidocaine 4 % Place 1 patch on the skin as directed by provider Daily As Needed for Mild Pain. Remove & Discard patch within 12 hours or as directed by MD      Metoclopramide HCl 10 MG tablet dispersible Place 1 tablet on the tongue Daily.      metoprolol succinate XL (TOPROL-XL) 25 MG 24 hr tablet Take 1 tablet by mouth Daily. 90 tablet 2    NIFEdipine CC (ADALAT CC) 30 MG 24 hr tablet Take 1 tablet by mouth Daily. 90 tablet 3    omeprazole (priLOSEC) 20 MG capsule Take 1 capsule by mouth Every 12 (Twelve) Hours.      pantoprazole (Protonix) 40 MG EC tablet Take 1 tablet by mouth Daily. 90 tablet 3    rosuvastatin (CRESTOR) 5 MG tablet Take 1 tablet by mouth every night at bedtime. 90 tablet 2    lisinopril (PRINIVIL,ZESTRIL) 10 MG tablet Take 1 tablet by mouth Daily. 90 tablet 3     No  current facility-administered medications for this visit.       Patient has no known allergies.    Past Medical History:   Diagnosis Date    Arthritis     Atrial fibrillation     Brain lesion     Cluster headache     Dissecting AAA (abdominal aortic aneurysm)     Elevated cholesterol     GERD (gastroesophageal reflux disease)     Headache, tension-type     Hypertension     Memory loss     Migraine     Myocardial infarct     Peripheral neuropathy 2017    Syncope and collapse 2024       Social History     Socioeconomic History    Marital status:    Tobacco Use    Smoking status: Every Day     Current packs/day: 0.50     Average packs/day: 0.3 packs/day for 38.9 years (9.9 ttl pk-yrs)     Types: Cigarettes     Start date: 1986     Passive exposure: Current    Smokeless tobacco: Never   Vaping Use    Vaping status: Never Used   Substance and Sexual Activity    Alcohol use: Not Currently    Drug use: Yes     Types: Marijuana     Comment: smokes marijuana daily    Sexual activity: Not Currently     Partners: Female     Birth control/protection: Abstinence       Family History   Problem Relation Age of Onset    Heart disease Mother     Stroke Mother     Other Father          in accident at 42    Heart disease Sister     Other Brother         PPM    Heart disease Brother     Other Other         grandparents medical history unknown    No Known Problems Sister     No Known Problems Brother        Review of Systems   Constitutional: Negative for malaise/fatigue and night sweats.   Cardiovascular:  Positive for chest pain and dyspnea on exertion. Negative for claudication, cyanosis, leg swelling, near-syncope, orthopnea, palpitations and syncope.   Respiratory:  Positive for shortness of breath. Negative for cough and wheezing.    Musculoskeletal:  Positive for neck pain and stiffness.   Gastrointestinal:  Negative for anorexia, heartburn, nausea and vomiting.   Genitourinary:  Negative for  "dysuria, hematuria, hesitancy and nocturia.   Neurological:  Positive for dizziness. Negative for light-headedness and loss of balance.   Psychiatric/Behavioral:  Negative for depression and memory loss. The patient is not nervous/anxious.            Objective     /78 (BP Location: Left arm)   Pulse 74   Ht 177.8 cm (70\")   Wt 106 kg (234 lb)   BMI 33.58 kg/m²     Constitutional:       Appearance: Not in distress.   Eyes:      Pupils: Pupils are equal, round, and reactive to light.   HENT:      Nose: Nose normal.   Pulmonary:      Effort: Pulmonary effort is normal.      Breath sounds: Normal breath sounds.   Cardiovascular:      PMI at left midclavicular line. Normal rate. Regular rhythm.      Murmurs: There is a systolic murmur.   Abdominal:      Palpations: Abdomen is soft.   Musculoskeletal: Normal range of motion.      Cervical back: Normal range of motion and neck supple. Skin:     General: Skin is warm and dry.   Neurological:      Mental Status: Alert.           ECG 12 Lead    Date/Time: 12/19/2024 10:32 AM  Performed by: Idalmis Wills APRN    Authorized by: Idalmis Wills APRN  Comparison: compared with previous ECG from 6/12/2024  Similar to previous ECG  Rhythm: sinus rhythm  BPM: 74    Clinical impression: non-specific ECG  Comments: Normal QT               Diagnoses and all orders for this visit:    1. PAF (paroxysmal atrial fibrillation) (Primary)  -     ECG 12 Lead    2. Primary hypertension  -     isosorbide dinitrate (ISORDIL) 20 MG tablet; Take 1 tablet by mouth 3 (Three) Times a Day.  Dispense: 270 tablet; Refill: 3  -     NIFEdipine CC (ADALAT CC) 30 MG 24 hr tablet; Take 1 tablet by mouth Daily.  Dispense: 90 tablet; Refill: 3    3. PSVT (paroxysmal supraventricular tachycardia)    4. Hypercholesterolemia  -     metoprolol succinate XL (TOPROL-XL) 25 MG 24 hr tablet; Take 1 tablet by mouth Daily.  Dispense: 90 tablet; Refill: 2  -     rosuvastatin (CRESTOR) 5 MG tablet; Take 1 " tablet by mouth every night at bedtime.  Dispense: 90 tablet; Refill: 2    5. Precordial pain  -     isosorbide dinitrate (ISORDIL) 20 MG tablet; Take 1 tablet by mouth 3 (Three) Times a Day.  Dispense: 270 tablet; Refill: 3    6. Dizziness  -     metoprolol succinate XL (TOPROL-XL) 25 MG 24 hr tablet; Take 1 tablet by mouth Daily.  Dispense: 90 tablet; Refill: 2    7. Dissection of thoracoabdominal aorta    8. Smoking    9. Obesity (BMI 30.0-34.9)    Other orders  -     lisinopril (PRINIVIL,ZESTRIL) 10 MG tablet; Take 1 tablet by mouth Daily.  Dispense: 90 tablet; Refill: 3             PAF: EKG done today showed NSR and normal QT.  Toprol continued at same. No PAF noted on recent holter, report discussed. Continue ASA and toprol. No NOAC added.     HTN: BP is stable, with mild elevation. Increase lisinopril to 10mg. Continue toprol and nifedipine at same. Limited sodium urged.     Cardiac status: Noted on rare occasion, similar to prior, continue isordil.  ASA continued.      Hyperlipidemia: Will continue with crestor. FLP with your office. Limited  carb diet urged.     Aortic dissection s/p TEVAR: Surgery with Dr. Salazar. CTA at  showed endograft in tact, no leak.     Neck pain: Followed by neurosurgery. May have intervention soon. If clearance should be needed, will contact office.    Tobacco abuse: Still smoking despite concerns.     Refills per request.     BMI noted at 33.58, good cardiac diet advised.     6 month follow up recommended, sooner if needed.           Problems Addressed this Visit          Cardiac and Vasculature    Dissection of thoracoabdominal aorta    PAF (paroxysmal atrial fibrillation) - Primary    Relevant Medications    isosorbide dinitrate (ISORDIL) 20 MG tablet    metoprolol succinate XL (TOPROL-XL) 25 MG 24 hr tablet    NIFEdipine CC (ADALAT CC) 30 MG 24 hr tablet    Other Relevant Orders    ECG 12 Lead    Primary hypertension    Relevant Medications    lisinopril (PRINIVIL,ZESTRIL) 10  MG tablet    isosorbide dinitrate (ISORDIL) 20 MG tablet    metoprolol succinate XL (TOPROL-XL) 25 MG 24 hr tablet    NIFEdipine CC (ADALAT CC) 30 MG 24 hr tablet    Precordial pain    Relevant Medications    isosorbide dinitrate (ISORDIL) 20 MG tablet       Tobacco    Smoking     Other Visit Diagnoses       PSVT (paroxysmal supraventricular tachycardia)        Relevant Medications    isosorbide dinitrate (ISORDIL) 20 MG tablet    metoprolol succinate XL (TOPROL-XL) 25 MG 24 hr tablet    NIFEdipine CC (ADALAT CC) 30 MG 24 hr tablet    Hypercholesterolemia        Relevant Medications    metoprolol succinate XL (TOPROL-XL) 25 MG 24 hr tablet    rosuvastatin (CRESTOR) 5 MG tablet    Dizziness        Relevant Medications    metoprolol succinate XL (TOPROL-XL) 25 MG 24 hr tablet    Obesity (BMI 30.0-34.9)              Diagnoses         Codes Comments    PAF (paroxysmal atrial fibrillation)    -  Primary ICD-10-CM: I48.0  ICD-9-CM: 427.31     Primary hypertension     ICD-10-CM: I10  ICD-9-CM: 401.9     PSVT (paroxysmal supraventricular tachycardia)     ICD-10-CM: I47.10  ICD-9-CM: 427.0     Hypercholesterolemia     ICD-10-CM: E78.00  ICD-9-CM: 272.0     Precordial pain     ICD-10-CM: R07.2  ICD-9-CM: 786.51     Dizziness     ICD-10-CM: R42  ICD-9-CM: 780.4     Dissection of thoracoabdominal aorta     ICD-10-CM: I71.03  ICD-9-CM: 441.03     Smoking     ICD-10-CM: F17.200  ICD-9-CM: 305.1     Obesity (BMI 30.0-34.9)     ICD-10-CM: E66.811  ICD-9-CM: 278.00                   Jorge A Gordillo  reports that he has been smoking cigarettes. He started smoking about 38 years ago. He has a 9.9 pack-year smoking history. He has been exposed to tobacco smoke. He has never used smokeless tobacco. I have educated him on the risk of diseases from using tobacco products.     I advised him to quit and he is not willing to quit.    I spent 3  minutes counseling the patient.                 Electronically signed by TOBY Christine  December 19, 2024 10:58 EST

## 2025-02-12 ENCOUNTER — TELEPHONE (OUTPATIENT)
Dept: CARDIOLOGY | Facility: CLINIC | Age: 50
End: 2025-02-12
Payer: COMMERCIAL

## 2025-02-12 NOTE — TELEPHONE ENCOUNTER
Caller: HALLEY TABOR    Relationship:     Best call back number: [phone]    What form or medical record are you requesting: LAST OFFICE NOTE, EKG, ECHO    Who is requesting this form or medical record from you: DR. PAYNE    How would you like to receive the form or medical records (pick-up, mail, fax): FAX  If fax, what is the fax number: [phone]  If mail, what is the address:   If pick-up, provide patient with address and location details    Timeframe paperwork needed: PROCEDURE ON 2.19.2025 ASAP    Additional notes:

## 2025-06-30 ENCOUNTER — OFFICE VISIT (OUTPATIENT)
Dept: CARDIOLOGY | Facility: CLINIC | Age: 50
End: 2025-06-30
Payer: COMMERCIAL

## 2025-06-30 VITALS
BODY MASS INDEX: 29.98 KG/M2 | HEART RATE: 100 BPM | HEIGHT: 70 IN | SYSTOLIC BLOOD PRESSURE: 182 MMHG | WEIGHT: 209.4 LBS | DIASTOLIC BLOOD PRESSURE: 120 MMHG

## 2025-06-30 DIAGNOSIS — R06.02 SHORTNESS OF BREATH: Primary | ICD-10-CM

## 2025-06-30 DIAGNOSIS — R07.2 PRECORDIAL PAIN: ICD-10-CM

## 2025-06-30 DIAGNOSIS — R42 DIZZINESS: ICD-10-CM

## 2025-06-30 DIAGNOSIS — E66.9 OBESITY (BMI 30-39.9): ICD-10-CM

## 2025-06-30 DIAGNOSIS — I71.03 DISSECTION OF THORACOABDOMINAL AORTA: ICD-10-CM

## 2025-06-30 DIAGNOSIS — E78.00 HYPERCHOLESTEROLEMIA: ICD-10-CM

## 2025-06-30 DIAGNOSIS — I10 PRIMARY HYPERTENSION: ICD-10-CM

## 2025-06-30 DIAGNOSIS — F41.9 ANXIETY: ICD-10-CM

## 2025-06-30 DIAGNOSIS — I48.0 PAF (PAROXYSMAL ATRIAL FIBRILLATION): ICD-10-CM

## 2025-06-30 PROCEDURE — 3080F DIAST BP >= 90 MM HG: CPT | Performed by: NURSE PRACTITIONER

## 2025-06-30 PROCEDURE — 3077F SYST BP >= 140 MM HG: CPT | Performed by: NURSE PRACTITIONER

## 2025-06-30 PROCEDURE — 99214 OFFICE O/P EST MOD 30 MIN: CPT | Performed by: NURSE PRACTITIONER

## 2025-06-30 PROCEDURE — 93000 ELECTROCARDIOGRAM COMPLETE: CPT | Performed by: NURSE PRACTITIONER

## 2025-06-30 RX ORDER — NIFEDIPINE 30 MG
30 TABLET, EXTENDED RELEASE ORAL DAILY
Qty: 90 TABLET | Refills: 3 | Status: SHIPPED | OUTPATIENT
Start: 2025-06-30

## 2025-06-30 RX ORDER — LISINOPRIL 20 MG/1
20 TABLET ORAL DAILY
Qty: 90 TABLET | Refills: 2 | Status: SHIPPED | OUTPATIENT
Start: 2025-06-30

## 2025-06-30 RX ORDER — ESCITALOPRAM OXALATE 10 MG/1
10 TABLET ORAL DAILY
Qty: 90 TABLET | Refills: 3 | Status: SHIPPED | OUTPATIENT
Start: 2025-06-30

## 2025-06-30 RX ORDER — METOPROLOL SUCCINATE 50 MG/1
50 TABLET, EXTENDED RELEASE ORAL DAILY
Qty: 90 TABLET | Refills: 2 | Status: SHIPPED | OUTPATIENT
Start: 2025-06-30

## 2025-06-30 RX ORDER — ROSUVASTATIN CALCIUM 5 MG/1
5 TABLET, COATED ORAL
Qty: 90 TABLET | Refills: 2 | Status: SHIPPED | OUTPATIENT
Start: 2025-06-30

## 2025-06-30 RX ORDER — ISOSORBIDE DINITRATE 20 MG/1
20 TABLET ORAL 3 TIMES DAILY
Qty: 270 TABLET | Refills: 3 | Status: SHIPPED | OUTPATIENT
Start: 2025-06-30

## 2025-06-30 RX ORDER — ASPIRIN 81 MG/1
81 TABLET, COATED ORAL DAILY
Qty: 90 TABLET | Refills: 2 | Status: SHIPPED | OUTPATIENT
Start: 2025-06-30

## 2025-06-30 NOTE — PROGRESS NOTES
Chief Complaint   Patient presents with    Follow-up     Cardiac Management: Pt states lightheaded and dizziness comes and goes. PT states things have increased about two months ago when nerve conditions has worsened.       Med Refill     On Aspirin Daily 81 Mg     Cardiac Medication Refills Pending      Patient did not bring med list or medicine bottles to appointment, med list has been reviewed and updated based on patient's knowledge of their meds.      Labs Only     Last Labs 4/2025    Hypertension     Pt states BP has been running very high recently. 199/119, was last reading.     PAF (Paroxysmal Atrial Fibrillation)     PT states SOA had been severe. No ER visit.     Chest Pain     PT states intermediate episodes of chest pain       Cardiac Complaints  Dyspnea, dizziness      Subjective   Jorge A Gordillo is a 50 y.o. male with HTN, headaches, PAF, aortic dissection, and syncope. He was initially referred in 2021 for consult.  He has history of dissection thoracic aorta with endovascular stenting in [place].  He had a brief episode of atrial fibrillation with spontaneous conversion to sinus.  He signed out AMA.  Due to chest pain he had repeat CT scan showing stable aorta, again signed out AMA.On 10/22/2020 Lexiscan stress test showed diaphragmatic attenuation but could not rule out old MI.  Echocardiogram showed LVEF around 55%.  Previous [facility] loop recorder reached end-of-life, removed.  On 3/9/2022 cardiac catheterization showed ectatic left coronary system but no significant CAD.  EF was around 60%.In September 2023 seen by Dr. Salazar at .  CTA and duplex showed stable appearing dissection with no increased in size, TEVAR in good position and no evidence of malperfusion. Repeat CT Jan 2024 abnormal, possible dissection past the stent. Was urged to follow with vascular at [facility], but they declined the patient as his surgery was done by Dr. Salazar. Went to ED in [facility] [place] in June 2024. He admitted that he had  a headache at that time and CT was done. Small vertebral stenosis noted, but not felt to be cause of his headache.He was urged to continue to follow with neurology and follow with cardiology for holter and possible PAF. Holter 7/2024 showed average HR 78 with rare PAC/PVC, no PAF. Went to ER at  Oct 2024 for abdominal pain CT of chest showed no leak of his endograft.     He comes today for follow up and admits to more dizziness and light headedness but has been having issues with BP management. He has been very stressed and thinks some of his issues with BP has been his anxiety. He does also report SOA and states he has been having more panic attacks. CP, dizziness, palpitations, and syncope denied. Labs 4/2025 showed: , K 3.2, Na 139, Creatinine 1.35, BUN 20, GFR >60, HH 13/42.             Cardiac History  Past Surgical History:   Procedure Laterality Date    CARDIAC CATHETERIZATION  04/26/2019    - Normal Coronaries. EF 55%    CARDIAC CATHETERIZATION  03/09/2022    Ectatic Coronaries. Low BP and HR    CARDIOVASCULAR STRESS TEST  03/26/2018    @ [facility]. Dr. Stringer- Adenosine- EF 42%. Inferior Infarct    CARDIOVASCULAR STRESS TEST  10/22/2021    Lexiscan- EF 46%. Reverse Redistribution Inferior wall    CAROTID STENT  2021    CONVERTED (HISTORICAL) HOLTER  07/16/2024    11 Days. Avg 78. . 3 SVT    ECHO - CONVERTED  09/17/2021    @ . EF 57%. Trace MR    ECHO - CONVERTED  03/27/2018    @ [facility]. - EF 60%. Trace MR    ECHO - CONVERTED  10/22/2021    TLS. EF 55%. LA- 3.9 Cm. Trace-Mild MR. RVSP- 14 mmHg    ECHO - CONVERTED  06/14/2023    TLS. EF 60%. Trace-Mild MR. RVSP- 30 mmHg    KNEE SURGERY      OTHER SURGICAL HISTORY  10/02/2021    CTA- Patent Thoracic stent.AAA distal to stent    OTHER SURGICAL HISTORY  09/17/2021    CTA- Dissection of thoracis aorta distal to (L) Subclavian artery    OTHER SURGICAL HISTORY  06/04/2018    Loop recorder placement    OTHER SURGICAL HISTORY  09/22/2021     @ [facility]. Thoracic endovascular stent    OTHER SURGICAL HISTORY  11/08/2021    Pulse O2- Borderline    OTHER SURGICAL HISTORY  10/26/2024    CTA chest at : No endograft leak       Current Outpatient Medications   Medication Sig Dispense Refill    Aspirin Low Dose 81 MG EC tablet Take 1 tablet by mouth Daily. 90 tablet 2    Azelastine HCl 137 MCG/SPRAY solution       cyclobenzaprine (FLEXERIL) 10 MG tablet Take 1 tablet by mouth 3 (Three) Times a Day As Needed for Muscle Spasms.      divalproex (DEPAKOTE ER) 500 MG 24 hr tablet Take 1 tablet by mouth 2 (Two) Times a Day.      famotidine (PEPCID) 40 MG tablet Take 1 tablet by mouth Daily.      hydrOXYzine (ATARAX) 25 MG tablet Take 1 tablet by mouth 3 (Three) Times a Day As Needed for Itching.      ibuprofen (ADVIL,MOTRIN) 800 MG tablet Take 1 tablet by mouth Daily.      isosorbide dinitrate (ISORDIL) 20 MG tablet Take 1 tablet by mouth 3 (Three) Times a Day. 270 tablet 3    Lidocaine 4 % Place 1 patch on the skin as directed by provider Daily As Needed for Mild Pain. Remove & Discard patch within 12 hours or as directed by MD      lisinopril (PRINIVIL,ZESTRIL) 20 MG tablet Take 1 tablet by mouth Daily. 90 tablet 2    Metoclopramide HCl 10 MG tablet dispersible Place 1 tablet on the tongue Daily.      metoprolol succinate XL (TOPROL-XL) 50 MG 24 hr tablet Take 1 tablet by mouth Daily. 90 tablet 2    NIFEdipine CC (ADALAT CC) 30 MG 24 hr tablet Take 1 tablet by mouth Daily. 90 tablet 3    omeprazole (priLOSEC) 20 MG capsule Take 1 capsule by mouth Every 12 (Twelve) Hours.      pantoprazole (Protonix) 40 MG EC tablet Take 1 tablet by mouth Daily. 90 tablet 3    rosuvastatin (CRESTOR) 5 MG tablet Take 1 tablet by mouth every night at bedtime. 90 tablet 2    escitalopram (Lexapro) 10 MG tablet Take 1 tablet by mouth Daily. 90 tablet 3     No current facility-administered medications for this visit.       Alpha-gal    Past Medical History:   Diagnosis Date    Arthritis      Atrial fibrillation     Brain lesion     Cluster headache     Dissecting AAA (abdominal aortic aneurysm)     Elevated cholesterol     GERD (gastroesophageal reflux disease)     Headache, tension-type     Hypertension     Memory loss     Migraine     Myocardial infarct     Peripheral neuropathy 2017    Syncope and collapse 2024       Social History     Socioeconomic History    Marital status:    Tobacco Use    Smoking status: Every Day     Current packs/day: 0.50     Average packs/day: 0.3 packs/day for 39.4 years (10.2 ttl pk-yrs)     Types: Cigarettes     Start date: 1986     Passive exposure: Current    Smokeless tobacco: Never   Vaping Use    Vaping status: Every Day    Start date: 2025    Substances: Nicotine    Devices: Disposable   Substance and Sexual Activity    Alcohol use: Not Currently    Drug use: Yes     Types: Marijuana     Comment: smokes marijuana daily    Sexual activity: Not Currently     Partners: Female     Birth control/protection: Abstinence       Family History   Problem Relation Age of Onset    Heart disease Mother     Stroke Mother     Other Father          in accident at 42    Heart disease Sister     Other Brother         PPM    Heart disease Brother     Other Other         grandparents medical history unknown    No Known Problems Sister     No Known Problems Brother        Review of Systems   Constitutional: Negative for malaise/fatigue and night sweats.   Cardiovascular:  Positive for dyspnea on exertion and palpitations. Negative for chest pain, claudication, irregular heartbeat, leg swelling, near-syncope, orthopnea and syncope.   Respiratory:  Positive for shortness of breath.    Musculoskeletal:  Positive for stiffness. Negative for back pain and joint pain.   Gastrointestinal:  Negative for anorexia, heartburn, nausea and vomiting.   Genitourinary:  Negative for dysuria, hematuria, hesitancy and nocturia.   Neurological:  Positive for  "dizziness and light-headedness.   Psychiatric/Behavioral:  Positive for depression. The patient is nervous/anxious.            Objective     BP (!) 182/120 (BP Location: Left arm, Patient Position: Sitting, Cuff Size: Adult)   Pulse 100   Ht 177.8 cm (70\")   Wt 95 kg (209 lb 6.4 oz)   BMI 30.05 kg/m²     Constitutional:       Appearance: Not in distress.   Eyes:      Pupils: Pupils are equal, round, and reactive to light.   Pulmonary:      Effort: Pulmonary effort is normal.      Breath sounds: Normal breath sounds.   Cardiovascular:      PMI at left midclavicular line. Normal rate. Regular rhythm.      Murmurs: There is a systolic murmur.   Musculoskeletal: Normal range of motion.      Cervical back: Normal range of motion and neck supple. Skin:     General: Skin is warm and dry.   Neurological:      Mental Status: Alert.           ECG 12 Lead    Date/Time: 6/30/2025 10:33 AM  Performed by: Idalmis Wills APRN    Authorized by: Idalmis Wills APRN  Comparison: compared with previous ECG from 12/19/2024  Similar to previous ECG  Rhythm: sinus rhythm  BPM: 77    Clinical impression: non-specific ECG  Comments: Normal QT               Diagnoses and all orders for this visit:    1. Shortness of breath (Primary)  -     Adult Transthoracic Echo Complete W/ Cont if Necessary Per Protocol; Future    2. Primary hypertension  -     Aspirin Low Dose 81 MG EC tablet; Take 1 tablet by mouth Daily.  Dispense: 90 tablet; Refill: 2  -     isosorbide dinitrate (ISORDIL) 20 MG tablet; Take 1 tablet by mouth 3 (Three) Times a Day.  Dispense: 270 tablet; Refill: 3  -     NIFEdipine CC (ADALAT CC) 30 MG 24 hr tablet; Take 1 tablet by mouth Daily.  Dispense: 90 tablet; Refill: 3    3. PAF (paroxysmal atrial fibrillation)  -     ECG 12 Lead    4. Hypercholesterolemia  -     metoprolol succinate XL (TOPROL-XL) 50 MG 24 hr tablet; Take 1 tablet by mouth Daily.  Dispense: 90 tablet; Refill: 2  -     rosuvastatin (CRESTOR) 5 MG " tablet; Take 1 tablet by mouth every night at bedtime.  Dispense: 90 tablet; Refill: 2    5. Dissection of thoracoabdominal aorta  -     Aspirin Low Dose 81 MG EC tablet; Take 1 tablet by mouth Daily.  Dispense: 90 tablet; Refill: 2    6. Precordial pain  -     isosorbide dinitrate (ISORDIL) 20 MG tablet; Take 1 tablet by mouth 3 (Three) Times a Day.  Dispense: 270 tablet; Refill: 3    7. Dizziness  -     metoprolol succinate XL (TOPROL-XL) 50 MG 24 hr tablet; Take 1 tablet by mouth Daily.  Dispense: 90 tablet; Refill: 2  -     Adult Transthoracic Echo Complete W/ Cont if Necessary Per Protocol; Future    8. Anxiety    9. Obesity (BMI 30-39.9)    Other orders  -     lisinopril (PRINIVIL,ZESTRIL) 20 MG tablet; Take 1 tablet by mouth Daily.  Dispense: 90 tablet; Refill: 2  -     escitalopram (Lexapro) 10 MG tablet; Take 1 tablet by mouth Daily.  Dispense: 90 tablet; Refill: 3               PAF: EKG done today showed NSR and normal QT.  Toprol will be increased to 50mg from 25mg as more palpitations noted. Continue ASA and toprol. No NOAC added. Limit caffeine.      HTN: BP is elevated, has been high at home. Will increase lisinopril to 20mg and metoprolol to 50mg. Will come on TH for BP check.     Cardiac status: CP denied. He does have SOA, will repeat echo to assess LV function, valve areas, and for any effusion. More recommendations to follow.     Hyperlipidemia: Will continue with crestor. FLP with your office. Limited  carb diet urged.     Aortic dissection s/p TEVAR: Surgery with Dr. Salazar. CTA at [facility] showed endograft in tact, no leak. Followed by then.      Neck pain: Followed by neurosurgery. Did not have procedure with Jim Muro, still seeing.     Anxiety: Add lexapro 10mg. Will follow with you for adjustment/changes as warranted.      Tobacco abuse: Still smoking despite concerns. Unable to quit.      Refills per request.     BMI noted at 33.58, good cardiac diet advised.     6 month follow up recommended,  sooner if needed.                Problems Addressed this Visit          Cardiac and Vasculature    Dissection of thoracoabdominal aorta    Relevant Medications    Aspirin Low Dose 81 MG EC tablet    PAF (paroxysmal atrial fibrillation)    Relevant Medications    isosorbide dinitrate (ISORDIL) 20 MG tablet    metoprolol succinate XL (TOPROL-XL) 50 MG 24 hr tablet    NIFEdipine CC (ADALAT CC) 30 MG 24 hr tablet    Other Relevant Orders    ECG 12 Lead    Primary hypertension    Relevant Medications    Aspirin Low Dose 81 MG EC tablet    isosorbide dinitrate (ISORDIL) 20 MG tablet    lisinopril (PRINIVIL,ZESTRIL) 20 MG tablet    metoprolol succinate XL (TOPROL-XL) 50 MG 24 hr tablet    NIFEdipine CC (ADALAT CC) 30 MG 24 hr tablet    Precordial pain    Relevant Medications    isosorbide dinitrate (ISORDIL) 20 MG tablet     Other Visit Diagnoses         Shortness of breath    -  Primary    Relevant Orders    Adult Transthoracic Echo Complete W/ Cont if Necessary Per Protocol      Hypercholesterolemia        Relevant Medications    metoprolol succinate XL (TOPROL-XL) 50 MG 24 hr tablet    rosuvastatin (CRESTOR) 5 MG tablet      Dizziness        Relevant Medications    metoprolol succinate XL (TOPROL-XL) 50 MG 24 hr tablet    Other Relevant Orders    Adult Transthoracic Echo Complete W/ Cont if Necessary Per Protocol      Anxiety          Obesity (BMI 30-39.9)              Diagnoses         Codes Comments      Shortness of breath    -  Primary ICD-10-CM: R06.02  ICD-9-CM: 786.05       Primary hypertension     ICD-10-CM: I10  ICD-9-CM: 401.9       PAF (paroxysmal atrial fibrillation)     ICD-10-CM: I48.0  ICD-9-CM: 427.31       Hypercholesterolemia     ICD-10-CM: E78.00  ICD-9-CM: 272.0       Dissection of thoracoabdominal aorta     ICD-10-CM: I71.03  ICD-9-CM: 441.03       Precordial pain     ICD-10-CM: R07.2  ICD-9-CM: 786.51       Dizziness     ICD-10-CM: R42  ICD-9-CM: 780.4       Anxiety     ICD-10-CM: F41.9  ICD-9-CM:  300.00       Obesity (BMI 30-39.9)     ICD-10-CM: E66.9  ICD-9-CM: 278.00                   Jorge A Gordillo  reports that he has been smoking cigarettes. He started smoking about 39 years ago. He has a 10.2 pack-year smoking history. He has been exposed to tobacco smoke. He has never used smokeless tobacco. I have educated him on the risk of diseases from using tobacco products.     I advised him to quit and he is not willing to quit.    I spent 3  minutes counseling the patient.                 Electronically signed by TOBY Christine June 30, 2025 10:49 EDT

## 2025-07-03 ENCOUNTER — CLINICAL SUPPORT (OUTPATIENT)
Dept: CARDIOLOGY | Facility: CLINIC | Age: 50
End: 2025-07-03
Payer: COMMERCIAL

## 2025-07-03 ENCOUNTER — HOSPITAL ENCOUNTER (OUTPATIENT)
Dept: CARDIOLOGY | Facility: HOSPITAL | Age: 50
Discharge: HOME | End: 2025-07-03
Admitting: NURSE PRACTITIONER
Payer: COMMERCIAL

## 2025-07-03 VITALS — WEIGHT: 209.44 LBS | HEIGHT: 70 IN | BODY MASS INDEX: 29.98 KG/M2

## 2025-07-03 VITALS — HEART RATE: 80 BPM | SYSTOLIC BLOOD PRESSURE: 178 MMHG | DIASTOLIC BLOOD PRESSURE: 92 MMHG

## 2025-07-03 DIAGNOSIS — R42 DIZZINESS: ICD-10-CM

## 2025-07-03 DIAGNOSIS — R06.02 SHORTNESS OF BREATH: ICD-10-CM

## 2025-07-03 LAB
AORTIC DIMENSIONLESS INDEX: 0.97 (DI)
AV MEAN PRESS GRAD SYS DOP V1V2: 2.1 MMHG
AV VMAX SYS DOP: 100.5 CM/SEC
BH CV ECHO MEAS - 2D AUTO EF SIEMENS: 57 %
BH CV ECHO MEAS - ACS: 2.31 CM
BH CV ECHO MEAS - AO MAX PG: 4 MMHG
BH CV ECHO MEAS - AO ROOT DIAM: 3.5 CM
BH CV ECHO MEAS - AO V2 VTI: 22.2 CM
BH CV ECHO MEAS - EDV(CUBED): 100.4 ML
BH CV ECHO MEAS - ESV(CUBED): 35.1 ML
BH CV ECHO MEAS - FS: 29.6 %
BH CV ECHO MEAS - IVS/LVPW: 1.08 CM
BH CV ECHO MEAS - IVSD: 1.33 CM
BH CV ECHO MEAS - LA DIMENSION: 3.1 CM
BH CV ECHO MEAS - LAT PEAK E' VEL: 6 CM/SEC
BH CV ECHO MEAS - LV MASS(C)D: 227.1 GRAMS
BH CV ECHO MEAS - LV MAX PG: 4.4 MMHG
BH CV ECHO MEAS - LV MEAN PG: 1.89 MMHG
BH CV ECHO MEAS - LV V1 MAX: 104.9 CM/SEC
BH CV ECHO MEAS - LV V1 VTI: 21.5 CM
BH CV ECHO MEAS - LVIDD: 4.6 CM
BH CV ECHO MEAS - LVIDS: 3.3 CM
BH CV ECHO MEAS - LVPWD: 1.22 CM
BH CV ECHO MEAS - MED PEAK E' VEL: 6 CM/SEC
BH CV ECHO MEAS - MR MAX PG: 58.5 MMHG
BH CV ECHO MEAS - MR MAX VEL: 382.5 CM/SEC
BH CV ECHO MEAS - MV A MAX VEL: 72.6 CM/SEC
BH CV ECHO MEAS - MV DEC SLOPE: 289.4 CM/SEC2
BH CV ECHO MEAS - MV DEC TIME: 0.27 SEC
BH CV ECHO MEAS - MV E MAX VEL: 78.7 CM/SEC
BH CV ECHO MEAS - MV E/A: 1.08
BH CV ECHO MEAS - MV MAX PG: 3 MMHG
BH CV ECHO MEAS - MV MEAN PG: 1.55 MMHG
BH CV ECHO MEAS - MV P1/2T: 83.1 MSEC
BH CV ECHO MEAS - MV V2 VTI: 33.3 CM
BH CV ECHO MEAS - MVA(P1/2T): 2.6 CM2
BH CV ECHO MEAS - PA V2 MAX: 97.6 CM/SEC
BH CV ECHO MEAS - RAP SYSTOLE: 8 MMHG
BH CV ECHO MEAS - RV MAX PG: 2.6 MMHG
BH CV ECHO MEAS - RV V1 MAX: 80.2 CM/SEC
BH CV ECHO MEAS - RV V1 VTI: 17.1 CM
BH CV ECHO MEAS - RVSP: 26.4 MMHG
BH CV ECHO MEAS - TAPSE (>1.6): 3.3 CM
BH CV ECHO MEAS - TR MAX PG: 18.4 MMHG
BH CV ECHO MEAS - TR MAX VEL: 214.5 CM/SEC
BH CV ECHO MEASUREMENTS AVERAGE E/E' RATIO: 13.12
BH CV XLRA - TDI S': 13.8 CM/SEC
LV EF 3D SEGMENTATION: 59 %
SINUS: 3.4 CM

## 2025-07-03 PROCEDURE — 93306 TTE W/DOPPLER COMPLETE: CPT

## 2025-07-03 NOTE — TELEPHONE ENCOUNTER
Patient in for BP check after increasing lisinopril to 20 mg daily and toprol xl 50 mg daily. /92 and HR 80.    He reports BP at home has been running 167/105 with HR 90 and 199/119 with . He is have headaches daily. Having intermittent sharp pain in chest.

## 2025-07-03 NOTE — TELEPHONE ENCOUNTER
Increase lisinopril to 30mg and Toprol to 100mg. Discuss anxiety/depression with PCP. May have clonidine 0.1mg to use as needed BID.

## 2025-07-07 NOTE — TELEPHONE ENCOUNTER
Attempted to contact patient, unable to reach and unable to leave message today. Attempted to contact Palmer Gordillo, unable to reach, phone number no longer in service.

## 2025-07-08 ENCOUNTER — TELEPHONE (OUTPATIENT)
Dept: CARDIOLOGY | Facility: CLINIC | Age: 50
End: 2025-07-08
Payer: COMMERCIAL

## 2025-07-08 RX ORDER — LISINOPRIL 30 MG/1
30 TABLET ORAL DAILY
Qty: 90 TABLET | Refills: 3 | Status: SHIPPED | OUTPATIENT
Start: 2025-07-08

## 2025-07-08 RX ORDER — METOPROLOL SUCCINATE 100 MG/1
100 TABLET, EXTENDED RELEASE ORAL DAILY
Qty: 90 TABLET | Refills: 3 | Status: SHIPPED | OUTPATIENT
Start: 2025-07-08

## 2025-07-08 RX ORDER — CLONIDINE HYDROCHLORIDE 0.1 MG/1
0.1 TABLET ORAL 2 TIMES DAILY PRN
Qty: 60 TABLET | Refills: 6 | Status: SHIPPED | OUTPATIENT
Start: 2025-07-08

## 2025-07-08 NOTE — TELEPHONE ENCOUNTER
Hub staff attempted to follow warm transfer process and was unsuccessful     Caller: Jorge A Gordillo    Relationship to patient: Self    Best call back number: [phone] OR WIFE'S CELL. WIFE'S NEW NUMBER: [phone]    Patient is needing: RETURNING CALLS TO [facility]. UNABLE TO GET THROUGH TO OFFICE.

## 2025-07-08 NOTE — TELEPHONE ENCOUNTER
See 7/8/25 telephone encounter.  Patient's wife, Palmer aware of recommendations to increase lisinopril to 30mg and Toprol to 100mg. Discuss anxiety/depression with PCP. May have clonidine 0.1mg to use as needed BID.     Idalmis,  Lisinopril and metoprolol XL script attached for review/cosign.    I did not add clonidine script, what parameters do you want on script?  [facility].

## 2025-07-08 NOTE — TELEPHONE ENCOUNTER
Patient gave verbal permission to speak with his spouse, Palmer.   See 7/3/2025 telephone encounter for communications.